# Patient Record
Sex: FEMALE | Race: ASIAN | NOT HISPANIC OR LATINO
[De-identification: names, ages, dates, MRNs, and addresses within clinical notes are randomized per-mention and may not be internally consistent; named-entity substitution may affect disease eponyms.]

---

## 2017-12-28 ENCOUNTER — RESULT REVIEW (OUTPATIENT)
Age: 32
End: 2017-12-28

## 2020-07-26 ENCOUNTER — RESULT REVIEW (OUTPATIENT)
Age: 35
End: 2020-07-26

## 2020-07-31 PROBLEM — Z00.00 ENCOUNTER FOR PREVENTIVE HEALTH EXAMINATION: Status: ACTIVE | Noted: 2020-07-31

## 2020-08-04 ENCOUNTER — ASOB RESULT (OUTPATIENT)
Age: 35
End: 2020-08-04

## 2020-08-04 ENCOUNTER — APPOINTMENT (OUTPATIENT)
Dept: MATERNAL FETAL MEDICINE | Facility: CLINIC | Age: 35
End: 2020-08-04
Payer: COMMERCIAL

## 2020-08-04 PROCEDURE — 99202 OFFICE O/P NEW SF 15 MIN: CPT | Mod: 95

## 2020-08-14 ENCOUNTER — TRANSCRIPTION ENCOUNTER (OUTPATIENT)
Age: 35
End: 2020-08-14

## 2020-08-19 ENCOUNTER — ASOB RESULT (OUTPATIENT)
Age: 35
End: 2020-08-19

## 2020-08-19 ENCOUNTER — APPOINTMENT (OUTPATIENT)
Dept: ANTEPARTUM | Facility: CLINIC | Age: 35
End: 2020-08-19
Payer: COMMERCIAL

## 2020-08-19 PROCEDURE — 76801 OB US < 14 WKS SINGLE FETUS: CPT

## 2020-08-19 PROCEDURE — 36416 COLLJ CAPILLARY BLOOD SPEC: CPT

## 2020-08-19 PROCEDURE — 76813 OB US NUCHAL MEAS 1 GEST: CPT

## 2020-08-20 ENCOUNTER — TRANSCRIPTION ENCOUNTER (OUTPATIENT)
Age: 35
End: 2020-08-20

## 2020-08-20 ENCOUNTER — RESULT REVIEW (OUTPATIENT)
Age: 35
End: 2020-08-20

## 2020-08-21 ENCOUNTER — RESULT REVIEW (OUTPATIENT)
Age: 35
End: 2020-08-21

## 2020-08-21 ENCOUNTER — TRANSCRIPTION ENCOUNTER (OUTPATIENT)
Age: 35
End: 2020-08-21

## 2020-08-21 ENCOUNTER — EMERGENCY (EMERGENCY)
Facility: HOSPITAL | Age: 35
LOS: 1 days | Discharge: HOME | End: 2020-08-21
Attending: STUDENT IN AN ORGANIZED HEALTH CARE EDUCATION/TRAINING PROGRAM
Payer: COMMERCIAL

## 2020-08-21 ENCOUNTER — INPATIENT (INPATIENT)
Facility: HOSPITAL | Age: 35
LOS: 0 days | Discharge: HOME | DRG: 770 | End: 2020-08-21
Attending: OBSTETRICS & GYNECOLOGY | Admitting: OBSTETRICS & GYNECOLOGY
Payer: COMMERCIAL

## 2020-08-21 VITALS
SYSTOLIC BLOOD PRESSURE: 112 MMHG | TEMPERATURE: 99 F | DIASTOLIC BLOOD PRESSURE: 69 MMHG | RESPIRATION RATE: 16 BRPM | HEART RATE: 78 BPM | OXYGEN SATURATION: 100 %

## 2020-08-21 VITALS
HEIGHT: 63 IN | WEIGHT: 160.94 LBS | TEMPERATURE: 98 F | OXYGEN SATURATION: 98 % | HEART RATE: 96 BPM | RESPIRATION RATE: 18 BRPM | SYSTOLIC BLOOD PRESSURE: 125 MMHG | DIASTOLIC BLOOD PRESSURE: 90 MMHG

## 2020-08-21 VITALS
WEIGHT: 160.06 LBS | DIASTOLIC BLOOD PRESSURE: 92 MMHG | SYSTOLIC BLOOD PRESSURE: 137 MMHG | RESPIRATION RATE: 18 BRPM | OXYGEN SATURATION: 98 % | TEMPERATURE: 98 F | HEART RATE: 104 BPM | HEIGHT: 63 IN

## 2020-08-21 DIAGNOSIS — O03.4 INCOMPLETE SPONTANEOUS ABORTION WITHOUT COMPLICATION: ICD-10-CM

## 2020-08-21 DIAGNOSIS — O03.9 COMPLETE OR UNSPECIFIED SPONTANEOUS ABORTION WITHOUT COMPLICATION: ICD-10-CM

## 2020-08-21 LAB
ALBUMIN SERPL ELPH-MCNC: 2.7 G/DL — LOW (ref 3.5–5)
ALBUMIN SERPL ELPH-MCNC: 2.8 G/DL — LOW (ref 3.5–5)
ALP SERPL-CCNC: 72 U/L — SIGNIFICANT CHANGE UP (ref 40–120)
ALP SERPL-CCNC: 73 U/L — SIGNIFICANT CHANGE UP (ref 40–120)
ALT FLD-CCNC: 19 U/L DA — SIGNIFICANT CHANGE UP (ref 10–60)
ALT FLD-CCNC: 19 U/L DA — SIGNIFICANT CHANGE UP (ref 10–60)
ANION GAP SERPL CALC-SCNC: 7 MMOL/L — SIGNIFICANT CHANGE UP (ref 5–17)
ANION GAP SERPL CALC-SCNC: 7 MMOL/L — SIGNIFICANT CHANGE UP (ref 5–17)
APTT BLD: 28.9 SEC — SIGNIFICANT CHANGE UP (ref 27.5–35.5)
AST SERPL-CCNC: 11 U/L — SIGNIFICANT CHANGE UP (ref 10–40)
AST SERPL-CCNC: 9 U/L — LOW (ref 10–40)
BASOPHILS # BLD AUTO: 0.05 K/UL — SIGNIFICANT CHANGE UP (ref 0–0.2)
BASOPHILS # BLD AUTO: 0.05 K/UL — SIGNIFICANT CHANGE UP (ref 0–0.2)
BASOPHILS NFR BLD AUTO: 0.2 % — SIGNIFICANT CHANGE UP (ref 0–2)
BASOPHILS NFR BLD AUTO: 0.3 % — SIGNIFICANT CHANGE UP (ref 0–2)
BILIRUB SERPL-MCNC: 0.4 MG/DL — SIGNIFICANT CHANGE UP (ref 0.2–1.2)
BILIRUB SERPL-MCNC: 0.5 MG/DL — SIGNIFICANT CHANGE UP (ref 0.2–1.2)
BUN SERPL-MCNC: 12 MG/DL — SIGNIFICANT CHANGE UP (ref 7–18)
BUN SERPL-MCNC: 12 MG/DL — SIGNIFICANT CHANGE UP (ref 7–18)
CALCIUM SERPL-MCNC: 8.6 MG/DL — SIGNIFICANT CHANGE UP (ref 8.4–10.5)
CALCIUM SERPL-MCNC: 8.7 MG/DL — SIGNIFICANT CHANGE UP (ref 8.4–10.5)
CHLORIDE SERPL-SCNC: 104 MMOL/L — SIGNIFICANT CHANGE UP (ref 96–108)
CHLORIDE SERPL-SCNC: 106 MMOL/L — SIGNIFICANT CHANGE UP (ref 96–108)
CO2 SERPL-SCNC: 24 MMOL/L — SIGNIFICANT CHANGE UP (ref 22–31)
CO2 SERPL-SCNC: 25 MMOL/L — SIGNIFICANT CHANGE UP (ref 22–31)
CREAT SERPL-MCNC: 0.57 MG/DL — SIGNIFICANT CHANGE UP (ref 0.5–1.3)
CREAT SERPL-MCNC: 0.58 MG/DL — SIGNIFICANT CHANGE UP (ref 0.5–1.3)
EOSINOPHIL # BLD AUTO: 0.02 K/UL — SIGNIFICANT CHANGE UP (ref 0–0.5)
EOSINOPHIL # BLD AUTO: 0.11 K/UL — SIGNIFICANT CHANGE UP (ref 0–0.5)
EOSINOPHIL NFR BLD AUTO: 0.1 % — SIGNIFICANT CHANGE UP (ref 0–6)
EOSINOPHIL NFR BLD AUTO: 0.6 % — SIGNIFICANT CHANGE UP (ref 0–6)
GLUCOSE SERPL-MCNC: 84 MG/DL — SIGNIFICANT CHANGE UP (ref 70–99)
GLUCOSE SERPL-MCNC: 97 MG/DL — SIGNIFICANT CHANGE UP (ref 70–99)
HCG SERPL-ACNC: HIGH MIU/ML
HCG SERPL-ACNC: SIGNIFICANT CHANGE UP MIU/ML
HCT VFR BLD CALC: 35.5 % — SIGNIFICANT CHANGE UP (ref 34.5–45)
HCT VFR BLD CALC: 37.2 % — SIGNIFICANT CHANGE UP (ref 34.5–45)
HGB BLD-MCNC: 11.7 G/DL — SIGNIFICANT CHANGE UP (ref 11.5–15.5)
HGB BLD-MCNC: 12.2 G/DL — SIGNIFICANT CHANGE UP (ref 11.5–15.5)
HIV 1 & 2 AB SERPL IA.RAPID: SIGNIFICANT CHANGE UP
IMM GRANULOCYTES NFR BLD AUTO: 0.4 % — SIGNIFICANT CHANGE UP (ref 0–1.5)
IMM GRANULOCYTES NFR BLD AUTO: 0.5 % — SIGNIFICANT CHANGE UP (ref 0–1.5)
INR BLD: 1.06 RATIO — SIGNIFICANT CHANGE UP (ref 0.88–1.16)
LYMPHOCYTES # BLD AUTO: 15.4 % — SIGNIFICANT CHANGE UP (ref 13–44)
LYMPHOCYTES # BLD AUTO: 17.1 % — SIGNIFICANT CHANGE UP (ref 13–44)
LYMPHOCYTES # BLD AUTO: 3.12 K/UL — SIGNIFICANT CHANGE UP (ref 1–3.3)
LYMPHOCYTES # BLD AUTO: 3.23 K/UL — SIGNIFICANT CHANGE UP (ref 1–3.3)
MCHC RBC-ENTMCNC: 26.6 PG — LOW (ref 27–34)
MCHC RBC-ENTMCNC: 26.8 PG — LOW (ref 27–34)
MCHC RBC-ENTMCNC: 32.8 GM/DL — SIGNIFICANT CHANGE UP (ref 32–36)
MCHC RBC-ENTMCNC: 33 GM/DL — SIGNIFICANT CHANGE UP (ref 32–36)
MCV RBC AUTO: 81 FL — SIGNIFICANT CHANGE UP (ref 80–100)
MCV RBC AUTO: 81.2 FL — SIGNIFICANT CHANGE UP (ref 80–100)
MONOCYTES # BLD AUTO: 0.91 K/UL — HIGH (ref 0–0.9)
MONOCYTES # BLD AUTO: 0.94 K/UL — HIGH (ref 0–0.9)
MONOCYTES NFR BLD AUTO: 4.3 % — SIGNIFICANT CHANGE UP (ref 2–14)
MONOCYTES NFR BLD AUTO: 5.2 % — SIGNIFICANT CHANGE UP (ref 2–14)
NEUTROPHILS # BLD AUTO: 13.94 K/UL — HIGH (ref 1.8–7.4)
NEUTROPHILS # BLD AUTO: 16.66 K/UL — HIGH (ref 1.8–7.4)
NEUTROPHILS NFR BLD AUTO: 76.3 % — SIGNIFICANT CHANGE UP (ref 43–77)
NEUTROPHILS NFR BLD AUTO: 79.6 % — HIGH (ref 43–77)
NRBC # BLD: 0 /100 WBCS — SIGNIFICANT CHANGE UP (ref 0–0)
NRBC # BLD: 0 /100 WBCS — SIGNIFICANT CHANGE UP (ref 0–0)
PLATELET # BLD AUTO: 339 K/UL — SIGNIFICANT CHANGE UP (ref 150–400)
PLATELET # BLD AUTO: 377 K/UL — SIGNIFICANT CHANGE UP (ref 150–400)
POTASSIUM SERPL-MCNC: 4 MMOL/L — SIGNIFICANT CHANGE UP (ref 3.5–5.3)
POTASSIUM SERPL-MCNC: 4 MMOL/L — SIGNIFICANT CHANGE UP (ref 3.5–5.3)
POTASSIUM SERPL-SCNC: 4 MMOL/L — SIGNIFICANT CHANGE UP (ref 3.5–5.3)
POTASSIUM SERPL-SCNC: 4 MMOL/L — SIGNIFICANT CHANGE UP (ref 3.5–5.3)
PROT SERPL-MCNC: 7.2 G/DL — SIGNIFICANT CHANGE UP (ref 6–8.3)
PROT SERPL-MCNC: 7.3 G/DL — SIGNIFICANT CHANGE UP (ref 6–8.3)
PROTHROM AB SERPL-ACNC: 12.4 SEC — SIGNIFICANT CHANGE UP (ref 10.6–13.6)
RBC # BLD: 4.37 M/UL — SIGNIFICANT CHANGE UP (ref 3.8–5.2)
RBC # BLD: 4.59 M/UL — SIGNIFICANT CHANGE UP (ref 3.8–5.2)
RBC # FLD: 12.4 % — SIGNIFICANT CHANGE UP (ref 10.3–14.5)
RBC # FLD: 12.4 % — SIGNIFICANT CHANGE UP (ref 10.3–14.5)
SARS-COV-2 RNA SPEC QL NAA+PROBE: SIGNIFICANT CHANGE UP
SODIUM SERPL-SCNC: 136 MMOL/L — SIGNIFICANT CHANGE UP (ref 135–145)
SODIUM SERPL-SCNC: 137 MMOL/L — SIGNIFICANT CHANGE UP (ref 135–145)
WBC # BLD: 18.25 K/UL — HIGH (ref 3.8–10.5)
WBC # BLD: 20.96 K/UL — HIGH (ref 3.8–10.5)
WBC # FLD AUTO: 18.25 K/UL — HIGH (ref 3.8–10.5)
WBC # FLD AUTO: 20.96 K/UL — HIGH (ref 3.8–10.5)

## 2020-08-21 PROCEDURE — 85730 THROMBOPLASTIN TIME PARTIAL: CPT

## 2020-08-21 PROCEDURE — 84702 CHORIONIC GONADOTROPIN TEST: CPT

## 2020-08-21 PROCEDURE — 99285 EMERGENCY DEPT VISIT HI MDM: CPT | Mod: 25

## 2020-08-21 PROCEDURE — 88309 TISSUE EXAM BY PATHOLOGIST: CPT

## 2020-08-21 PROCEDURE — 86703 HIV-1/HIV-2 1 RESULT ANTBDY: CPT

## 2020-08-21 PROCEDURE — 86901 BLOOD TYPING SEROLOGIC RH(D): CPT

## 2020-08-21 PROCEDURE — 96374 THER/PROPH/DIAG INJ IV PUSH: CPT

## 2020-08-21 PROCEDURE — 85027 COMPLETE CBC AUTOMATED: CPT

## 2020-08-21 PROCEDURE — 80053 COMPREHEN METABOLIC PANEL: CPT

## 2020-08-21 PROCEDURE — 88305 TISSUE EXAM BY PATHOLOGIST: CPT

## 2020-08-21 PROCEDURE — 99284 EMERGENCY DEPT VISIT MOD MDM: CPT | Mod: 25

## 2020-08-21 PROCEDURE — 88305 TISSUE EXAM BY PATHOLOGIST: CPT | Mod: 26

## 2020-08-21 PROCEDURE — 88309 TISSUE EXAM BY PATHOLOGIST: CPT | Mod: 26

## 2020-08-21 PROCEDURE — 36415 COLL VENOUS BLD VENIPUNCTURE: CPT

## 2020-08-21 PROCEDURE — 76830 TRANSVAGINAL US NON-OB: CPT

## 2020-08-21 PROCEDURE — 86850 RBC ANTIBODY SCREEN: CPT

## 2020-08-21 PROCEDURE — 85610 PROTHROMBIN TIME: CPT

## 2020-08-21 PROCEDURE — 87635 SARS-COV-2 COVID-19 AMP PRB: CPT

## 2020-08-21 PROCEDURE — 76856 US EXAM PELVIC COMPLETE: CPT | Mod: 26

## 2020-08-21 PROCEDURE — 76830 TRANSVAGINAL US NON-OB: CPT | Mod: 26

## 2020-08-21 PROCEDURE — 76856 US EXAM PELVIC COMPLETE: CPT

## 2020-08-21 PROCEDURE — 86900 BLOOD TYPING SEROLOGIC ABO: CPT

## 2020-08-21 RX ORDER — MORPHINE SULFATE 50 MG/1
4 CAPSULE, EXTENDED RELEASE ORAL EVERY 4 HOURS
Refills: 0 | Status: DISCONTINUED | OUTPATIENT
Start: 2020-08-21 | End: 2020-08-21

## 2020-08-21 RX ORDER — SODIUM CHLORIDE 9 MG/ML
1000 INJECTION, SOLUTION INTRAVENOUS
Refills: 0 | Status: DISCONTINUED | OUTPATIENT
Start: 2020-08-21 | End: 2020-08-21

## 2020-08-21 RX ORDER — MISOPROSTOL 200 UG/1
4 TABLET ORAL
Qty: 4 | Refills: 0
Start: 2020-08-21

## 2020-08-21 RX ORDER — ACETAMINOPHEN 500 MG
975 TABLET ORAL EVERY 6 HOURS
Refills: 0 | Status: DISCONTINUED | OUTPATIENT
Start: 2020-08-21 | End: 2020-08-21

## 2020-08-21 RX ORDER — FENTANYL CITRATE 50 UG/ML
25 INJECTION INTRAVENOUS
Refills: 0 | Status: DISCONTINUED | OUTPATIENT
Start: 2020-08-21 | End: 2020-08-21

## 2020-08-21 RX ORDER — FENTANYL CITRATE 50 UG/ML
50 INJECTION INTRAVENOUS
Refills: 0 | Status: DISCONTINUED | OUTPATIENT
Start: 2020-08-21 | End: 2020-08-21

## 2020-08-21 RX ORDER — MORPHINE SULFATE 50 MG/1
4 CAPSULE, EXTENDED RELEASE ORAL ONCE
Refills: 0 | Status: DISCONTINUED | OUTPATIENT
Start: 2020-08-21 | End: 2020-08-21

## 2020-08-21 RX ORDER — ONDANSETRON 8 MG/1
4 TABLET, FILM COATED ORAL EVERY 6 HOURS
Refills: 0 | Status: DISCONTINUED | OUTPATIENT
Start: 2020-08-21 | End: 2020-08-21

## 2020-08-21 RX ORDER — KETOROLAC TROMETHAMINE 30 MG/ML
15 SYRINGE (ML) INJECTION EVERY 6 HOURS
Refills: 0 | Status: DISCONTINUED | OUTPATIENT
Start: 2020-08-21 | End: 2020-08-21

## 2020-08-21 RX ADMIN — MORPHINE SULFATE 4 MILLIGRAM(S): 50 CAPSULE, EXTENDED RELEASE ORAL at 04:21

## 2020-08-21 RX ADMIN — Medication 975 MILLIGRAM(S): at 16:28

## 2020-08-21 RX ADMIN — SODIUM CHLORIDE 200 MILLILITER(S): 9 INJECTION, SOLUTION INTRAVENOUS at 12:15

## 2020-08-21 RX ADMIN — Medication 975 MILLIGRAM(S): at 15:52

## 2020-08-21 NOTE — CONSULT NOTE ADULT - SUBJECTIVE AND OBJECTIVE BOX
HPI: 35y  LMP  presents with vaginal bleeding saturated one full pad from 7am-9am with clots ,no vaginal discharge; minimal pelvic pain, no chest pain , no sob, no  dizziness, no palpitations, no n/v/d/c, no fever; no chills.   Pt s/p incomplete ab  took last dose of Po cytotec 200mcg- 4 tabs PO x 1 at 530am. Last meal 7pm chicken, rice, plantain wrap with ice cream.     pob/gynhx:  ; no std's; no abn pap smear; no fibroids; last visit with [facility] in [place] 2020  pmhx: denies   pshx: c/s 2014: LINDA rubalcava, no complications   all: NKA  meds:  cytotec 200mcg--> 4 tabs every 8 hours took first dose at 530am   sochx: no etoh/drug/tobacco use    REVIEW OF SYSTEMS: see HPI	    PE:  Vital Signs Last 24 Hrs  T(C): 36.7 (21 Aug 2020 07:39), Max: 36.9 (21 Aug 2020 02:53)  T(F): 98 (21 Aug 2020 07:39), Max: 98.4 (21 Aug 2020 02:53)  HR: 96 (21 Aug 2020 07:39) (96 - 104)  BP: 125/90 (21 Aug 2020 07:39) (125/90 - 137/92)  BP(mean): --  RR: 18 (21 Aug 2020 07:39) (18 - 18)  SpO2: 98% (21 Aug 2020 07:39) (98% - 98%)    abd: +bs; soft, nt, nd, no rebound or guarding; no cvat b/l  pelvis: no cmt; moderate vaginal bleeding ; no odor, 1cm/long, no uterine tenderness; uterus approx 10 wks size regular in contour, mobile. No adnexal tenderness or masses appreciated b/l     LABS:                        12.2   18.25 )-----------( 377      ( 21 Aug 2020 03:56 )             37.2     08-21    137  |  106  |  12  ----------------------------<  97  4.0   |  24  |  0.57    Ca    8.6      21 Aug 2020 03:56    TPro  7.3  /  Alb  2.8<L>  /  TBili  0.4  /  DBili  x   /  AST  9<L>  /  ALT  19  /  AlkPhos  73  08-21    PT/INR - ( 21 Aug 2020 03:56 )   PT: 12.4 sec;   INR: 1.06 ratio         PTT - ( 21 Aug 2020 03:56 )  PTT:28.9 sec      RADIOLOGY & ADDITIONAL STUDIES:  sono repeat pending     a/p  s/p incomplete  at 12 weeks s/p PO cytotec with Vaginal bleeding   will repeat cbc, bhcg, TV sono   will reevaluate after labs and sono is back, will keep NPO   -d/w Dr Villela

## 2020-08-21 NOTE — CONSULT NOTE ADULT - PROBLEM SELECTOR RECOMMENDATION 9
-discussed the risks and benefits of dilation and curettage versus misoprostol therapy with the patient at bedside. Pt requests a trial of misoprostol therapy at this time. Pt understands and is in agreement with plan. Will give 800mcg Misoprostol buccally and re-evaluate in 1 hour.   -specimen sent to pathology  -pt seen with and plan per katie Jarquin attending
dilation and curettage: suction   nothing in vagina x 6 weeks  no heavy lifting

## 2020-08-21 NOTE — ED PROVIDER NOTE - PROGRESS NOTE DETAILS
Dr Bennett in the ER, recommend admission and D&C in the OR. Pt agrees w plan, will send STAT Covid swab

## 2020-08-21 NOTE — DISCHARGE NOTE NURSING/CASE MANAGEMENT/SOCIAL WORK - PATIENT PORTAL LINK FT
You can access the FollowMyHealth Patient Portal offered by [facility] by registering at the following website: [email or web address]. By joining [employer]’s FollowMyHealth portal, you will also be able to view your health information using other applications (apps) compatible with our system.

## 2020-08-21 NOTE — ED ADULT TRIAGE NOTE - CHIEF COMPLAINT QUOTE
as per pt " I got discharge I hour ago I started getting  heavy vaginal bleeding , I was told to come back "

## 2020-08-21 NOTE — ED PROVIDER NOTE - OBJECTIVE STATEMENT
35 year old female, 12 weeks pregnant, , presenting with vaginal bleeding that was heavier just prior to arriving in the ED today. Denies nausea, vomiting, abdominal pain, or any other complaints. Endorses going through 3 pads before coming in.

## 2020-08-21 NOTE — ED ADULT NURSE NOTE - OBJECTIVE STATEMENT
Pt  was just discharged from this ER about 1 hr ago  As soon as she arrived home , had an episode of heavy vaginal bleeding  Had a spontaneous  this morning  C/o intermittent cramps

## 2020-08-21 NOTE — ED PROVIDER NOTE - GENITOURINARY, MLM
Large amounts of blood on external vaginal exam. Noting intact fetal parts have been falling outside of the cervical canal.

## 2020-08-21 NOTE — ED ADULT NURSE REASSESSMENT NOTE - NS ED NURSE REASSESS COMMENT FT1
4 am GYN  PA examined the patient   vaginal content  sent to pathology  morphine given for pain with good releif

## 2020-08-21 NOTE — ED PROVIDER NOTE - PATIENT PORTAL LINK FT
You can access the FollowMyHealth Patient Portal offered by [facility] by registering at the following website: [email or web address]. By joining [employer]’s FollowMyHealth portal, you will also be able to view your health information using other applications (apps) compatible with our system.

## 2020-08-21 NOTE — CONSULT NOTE ADULT - ASSESSMENT
a/p: 34 yo  siup at 2020 with complete vs incomplete SAB
CBC Full  -  ( 21 Aug 2020 10:22 )  WBC Count : 20.96 K/uL  RBC Count : 4.37 M/uL  Hemoglobin : 11.7 g/dL  Hematocrit : 35.5 %  Platelet Count - Automated : 339 K/uL  Mean Cell Volume : 81.2 fl  Mean Cell Hemoglobin : 26.8 pg  Mean Cell Hemoglobin Concentration : 33.0 gm/dL  Auto Neutrophil # : 16.66 K/uL  Auto Lymphocyte # : 3.23 K/uL  Auto Monocyte # : 0.91 K/uL  Auto Eosinophil # : 0.02 K/uL  Auto Basophil # : 0.05 K/uL  Auto Neutrophil % : 79.6 %  Auto Lymphocyte % : 15.4 %  < from: US Transvaginal (20 @ 10:00) >  INTERPRETATION:  Transabdominal and transvaginal pelvic ultrasounds    Indication: Spontaneous . Rule out retained products of conception.    Transabdominal and transvaginal pelvic ultrasounds are performed and they are reported together. No prior examination is available for comparison. The uterus measures 12.6 x 7.6 x 8.2 cm. There is a 2.9 cm intramural fibroid in the anterior uterine segment. The endometrial stripe measures 2.5 cm in thickness. Heterogeneous mixed hyper and hypoechoic material is noted within the endometrium without internal color Doppler flow, suggestive of hemorrhage. No evidence for retained products of conception. The cervix is closed.    The right ovary measures 3.7 x 3.4 x 3.6 cm and the left ovary measures 3.9 x 2.6 x 3.9 cm. Both ovaries appear grossly unremarkable. No pelvic free fluid.    Impression:    Heterogeneous mixed hyper and hypoechoic material within the endometrium without internal color Doppler flow, suggestive of hemorrhage. No evidence for retained products of conception    a/p   incomplete  with vaginal bleeding s/p cytotec   obtained consent for D&C suction  npo  OR team notified   Pt seen with

## 2020-08-21 NOTE — CONSULT NOTE ADULT - SUBJECTIVE AND OBJECTIVE BOX
HPI: 36 yo  LMP 2020 siup at 11wks 5 days presents to the ED c/o heavy vaginal bleeding since 2am this morning. Pt states that she began spotting yesterday around noon and around 2 am she was awoken with abd cramping and heavier bleeding. states that she has since continued to have heavy vaginal bleeding with clots and en route to the hospital she passed what she believes to be the fetus. Pt denies fever, chills, pelvic pain, cp, sob, palpitations, n/v/d/c, lightheadedness, dizziness, or syncope. Offers no other complaints at this time.     PMHx: denies   medS: PNV  all: nka  PSH: c/s x1   social: denies T/A/D, denies hx of anxiety or depression   POBGYNHx: denies fibroids, ovarian cysts, abnl paps, or STIs  P1:   c/s 2014 FT breech baby girl 8lbs 12 oz, c/b gHTN       REVIEW OF SYSTEMS: see HPI	    PE:  Vital Signs Last 24 Hrs  T(C): 36.9 (21 Aug 2020 02:53), Max: 36.9 (21 Aug 2020 02:53)  T(F): 98.4 (21 Aug 2020 02:53), Max: 98.4 (21 Aug 2020 02:53)  HR: 104 (21 Aug 2020 02:53) (104 - 104)  BP: 137/92 (21 Aug 2020 02:53) (137/92 - 137/92)  BP(mean): --  RR: 18 (21 Aug 2020 02:53) (18 - 18)  SpO2: 98% (21 Aug 2020 02:53) (98% - 98%)      VSS  gen: in NAD, resting comfortably   abd: soft, nt, nd, no rebound or guarding  ext: no edema, no calf tenderness  pelvis: speculum inserted, clots visualized in vault and removed with ring forceps, placenta visualized and removed with ring forceps to be sent to pathology.  limited bedside sono: endometrial stripe measures 2.09 cm        LABS:                        12.2   18.25 )-----------( 377      ( 21 Aug 2020 03:56 )             37.2     08-21    137  |  106  |  12  ----------------------------<  97  4.0   |  24  |  0.57    Ca    8.6      21 Aug 2020 03:56    TPro  7.3  /  Alb  2.8<L>  /  TBili  0.4  /  DBili  x   /  AST  9<L>  /  ALT  19  /  AlkPhos  73  08-21    PT/INR - ( 21 Aug 2020 03:56 )   PT: 12.4 sec;   INR: 1.06 ratio         PTT - ( 21 Aug 2020 03:56 )  PTT:28.9 sec    Type + Screen (20 @ 04:12)    ABO RH Interpretation: B POS HPI: 34 yo  LMP 2020 siup at 11wks 5 days presents to the ED c/o heavy vaginal bleeding since 2am this morning. Pt states that she began spotting yesterday around noon and around 2 am she was awoken with abd cramping and heavier bleeding. states that she has since continued to have heavy vaginal bleeding with clots and en route to the hospital she passed what she believes to be the fetus. Pt denies fever, chills, pelvic pain, cp, sob, palpitations, n/v/d/c, lightheadedness, dizziness, or syncope. Offers no other complaints at this time.     PMHx: denies   medS: PNV  all: nka  PSH: c/s x1   social: denies T/A/D, denies hx of anxiety or depression   POBGYNHx: denies fibroids, ovarian cysts, abnl paps, or STIs  P1:   c/s 2014 FT breech baby girl 8lbs 12 oz, c/b gHTN       REVIEW OF SYSTEMS: see HPI	    PE:  Vital Signs Last 24 Hrs  T(C): 36.9 (21 Aug 2020 02:53), Max: 36.9 (21 Aug 2020 02:53)  T(F): 98.4 (21 Aug 2020 02:53), Max: 98.4 (21 Aug 2020 02:53)  HR: 104 (21 Aug 2020 02:53) (104 - 104)  BP: 137/92 (21 Aug 2020 02:53) (137/92 - 137/92)  BP(mean): --  RR: 18 (21 Aug 2020 02:53) (18 - 18)  SpO2: 98% (21 Aug 2020 02:53) (98% - 98%)      VSS  gen: in NAD, resting comfortably   abd: soft, nt, nd, no rebound or guarding  ext: no edema, no calf tenderness  pelvis: speculum inserted, clots visualized in vault and removed with ring forceps, placenta visualized and removed with ring forceps to be sent to pathology. performed by Dr. Wellington  limited bedside sono: endometrial stripe measures 2.09 cm, performed by Dr. Wellington.         LABS:                        12.2   18.25 )-----------( 377      ( 21 Aug 2020 03:56 )             37.2     08-21    137  |  106  |  12  ----------------------------<  97  4.0   |  24  |  0.57    Ca    8.6      21 Aug 2020 03:56    TPro  7.3  /  Alb  2.8<L>  /  TBili  0.4  /  DBili  x   /  AST  9<L>  /  ALT  19  /  AlkPhos  73  08-21    PT/INR - ( 21 Aug 2020 03:56 )   PT: 12.4 sec;   INR: 1.06 ratio         PTT - ( 21 Aug 2020 03:56 )  PTT:28.9 sec    Type + Screen (20 @ 04:12)    ABO RH Interpretation: B POS

## 2020-08-21 NOTE — ED ADULT NURSE NOTE - CHPI ED NUR SYMPTOMS NEG
no abdominal pain/no back pain/no coffee grounds emesis/no discharge/no fever/no nausea/no pain/no vomiting

## 2020-08-21 NOTE — DISCHARGE NOTE PROVIDER - CARE PROVIDER_API CALL
Alexis Herring  Obstetrics and Gynecology  [address] [facility], [address] [place] [address]  Phone: [phone]  Fax: [phone]  Follow Up Time:

## 2020-08-21 NOTE — ED ADULT NURSE NOTE - CAS EDN DISCHARGE INTERVENTIONS
Patient Seen in: [facility] Emergency Department    History   Patient presents with:  Nausea/Vomiting/Diarrhea (gastrointestinal)    Stated Complaint: vomiting x30min with n/t to bilat hands and chest    HPI    Patient is a 80-year-old female presen acute distress, Well appearing and non-toxic, Alert and oriented X 3   HEENT: Normocephalic, atraumatic. Moist mucous membranes. Pupils equal round reactive to light accommodation, extraocular motion is intact, sclerae white, conjunctiva is pink.   Waldo Brooks RAINBOW DRAW LAVENDER   RAINBOW DRAW LIGHT GREEN   RAINBOW DRAW GOLD   CBC W/ DIFFERENTIAL   glucose 137    EKG    Rate, intervals and axes as noted on EKG Report. Rate: 72  Rhythm: Sinus Rhythm  Reading: Nonspecific changes.   No acute process paroxysmal positional vertigo, unspecified laterality  (primary encounter diagnosis)    Disposition:  Discharge  5/20/2019  2:43 pm    Follow-up:  Tucker Noe, [address] [facility] [address] [phone]      As needed    Rosa Wray MD IV discontinued, cath removed intact

## 2020-08-21 NOTE — ED PROVIDER NOTE - CLINICAL SUMMARY MEDICAL DECISION MAKING FREE TEXT BOX
34yo F w spontaneous, likely incomplete , w recurring bleeding after Misoprostol 1 hr ago. VS wnl, patient well appearing, pink conjunctivae. OB PA in the ED, will follow recs

## 2020-08-21 NOTE — ED PROVIDER NOTE - PROGRESS NOTE DETAILS
patient had bedside evacuation of product by gyn. patient endorses bleeding slowed down after cytotecc. per gyn pa, patient stable for dc. patient vital stable. givne cytotec for tonight. instructed to return if noting symptoms of anemia, worsening bleed, pain or other new symptoms.

## 2020-08-28 ENCOUNTER — TRANSCRIPTION ENCOUNTER (OUTPATIENT)
Age: 35
End: 2020-08-28

## 2020-10-14 ENCOUNTER — APPOINTMENT (OUTPATIENT)
Dept: ANTEPARTUM | Facility: CLINIC | Age: 35
End: 2020-10-14

## 2022-04-08 ENCOUNTER — OUTPATIENT (OUTPATIENT)
Dept: OUTPATIENT SERVICES | Facility: HOSPITAL | Age: 37
LOS: 1 days | End: 2022-04-08
Payer: COMMERCIAL

## 2022-04-08 DIAGNOSIS — O26.899 OTHER SPECIFIED PREGNANCY RELATED CONDITIONS, UNSPECIFIED TRIMESTER: ICD-10-CM

## 2022-04-08 DIAGNOSIS — Z3A.00 WEEKS OF GESTATION OF PREGNANCY NOT SPECIFIED: ICD-10-CM

## 2022-04-08 LAB
ALBUMIN SERPL ELPH-MCNC: 2.6 G/DL — LOW (ref 3.5–5)
ALP SERPL-CCNC: 46 U/L — SIGNIFICANT CHANGE UP (ref 40–120)
ALT FLD-CCNC: 19 U/L DA — SIGNIFICANT CHANGE UP (ref 10–60)
ANION GAP SERPL CALC-SCNC: 9 MMOL/L — SIGNIFICANT CHANGE UP (ref 5–17)
APPEARANCE UR: CLEAR — SIGNIFICANT CHANGE UP
APTT BLD: 27 SEC — LOW (ref 27.5–35.5)
AST SERPL-CCNC: 13 U/L — SIGNIFICANT CHANGE UP (ref 10–40)
BACTERIA # UR AUTO: ABNORMAL /HPF
BASOPHILS # BLD AUTO: 0.02 K/UL — SIGNIFICANT CHANGE UP (ref 0–0.2)
BASOPHILS NFR BLD AUTO: 0.2 % — SIGNIFICANT CHANGE UP (ref 0–2)
BILIRUB SERPL-MCNC: 0.3 MG/DL — SIGNIFICANT CHANGE UP (ref 0.2–1.2)
BILIRUB UR-MCNC: NEGATIVE — SIGNIFICANT CHANGE UP
BUN SERPL-MCNC: 8 MG/DL — SIGNIFICANT CHANGE UP (ref 7–18)
CALCIUM SERPL-MCNC: 9.2 MG/DL — SIGNIFICANT CHANGE UP (ref 8.4–10.5)
CHLORIDE SERPL-SCNC: 108 MMOL/L — SIGNIFICANT CHANGE UP (ref 96–108)
CO2 SERPL-SCNC: 22 MMOL/L — SIGNIFICANT CHANGE UP (ref 22–31)
COLOR SPEC: YELLOW — SIGNIFICANT CHANGE UP
CREAT ?TM UR-MCNC: 92 MG/DL — SIGNIFICANT CHANGE UP
CREAT SERPL-MCNC: 0.55 MG/DL — SIGNIFICANT CHANGE UP (ref 0.5–1.3)
DIFF PNL FLD: ABNORMAL
EGFR: 122 ML/MIN/1.73M2 — SIGNIFICANT CHANGE UP
EOSINOPHIL # BLD AUTO: 0.07 K/UL — SIGNIFICANT CHANGE UP (ref 0–0.5)
EOSINOPHIL NFR BLD AUTO: 0.7 % — SIGNIFICANT CHANGE UP (ref 0–6)
EPI CELLS # UR: SIGNIFICANT CHANGE UP /HPF
FIBRINOGEN PPP-MCNC: 584 MG/DL — HIGH (ref 340–550)
GLUCOSE SERPL-MCNC: 95 MG/DL — SIGNIFICANT CHANGE UP (ref 70–99)
GLUCOSE UR QL: NEGATIVE — SIGNIFICANT CHANGE UP
HCT VFR BLD CALC: 32.8 % — LOW (ref 34.5–45)
HGB BLD-MCNC: 10.6 G/DL — LOW (ref 11.5–15.5)
IMM GRANULOCYTES NFR BLD AUTO: 0.5 % — SIGNIFICANT CHANGE UP (ref 0–1.5)
INR BLD: 0.98 RATIO — SIGNIFICANT CHANGE UP (ref 0.88–1.16)
KETONES UR-MCNC: NEGATIVE — SIGNIFICANT CHANGE UP
LDH SERPL L TO P-CCNC: 124 U/L — SIGNIFICANT CHANGE UP (ref 120–225)
LEUKOCYTE ESTERASE UR-ACNC: NEGATIVE — SIGNIFICANT CHANGE UP
LYMPHOCYTES # BLD AUTO: 2.41 K/UL — SIGNIFICANT CHANGE UP (ref 1–3.3)
LYMPHOCYTES # BLD AUTO: 23.5 % — SIGNIFICANT CHANGE UP (ref 13–44)
MCHC RBC-ENTMCNC: 27 PG — SIGNIFICANT CHANGE UP (ref 27–34)
MCHC RBC-ENTMCNC: 32.3 GM/DL — SIGNIFICANT CHANGE UP (ref 32–36)
MCV RBC AUTO: 83.5 FL — SIGNIFICANT CHANGE UP (ref 80–100)
MONOCYTES # BLD AUTO: 0.54 K/UL — SIGNIFICANT CHANGE UP (ref 0–0.9)
MONOCYTES NFR BLD AUTO: 5.3 % — SIGNIFICANT CHANGE UP (ref 2–14)
NEUTROPHILS # BLD AUTO: 7.15 K/UL — SIGNIFICANT CHANGE UP (ref 1.8–7.4)
NEUTROPHILS NFR BLD AUTO: 69.8 % — SIGNIFICANT CHANGE UP (ref 43–77)
NITRITE UR-MCNC: NEGATIVE — SIGNIFICANT CHANGE UP
NRBC # BLD: 0 /100 WBCS — SIGNIFICANT CHANGE UP (ref 0–0)
PH UR: 6 — SIGNIFICANT CHANGE UP (ref 5–8)
PLATELET # BLD AUTO: 347 K/UL — SIGNIFICANT CHANGE UP (ref 150–400)
POTASSIUM SERPL-MCNC: 3.9 MMOL/L — SIGNIFICANT CHANGE UP (ref 3.5–5.3)
POTASSIUM SERPL-SCNC: 3.9 MMOL/L — SIGNIFICANT CHANGE UP (ref 3.5–5.3)
PROT ?TM UR-MCNC: 10 MG/DL — SIGNIFICANT CHANGE UP (ref 0–12)
PROT SERPL-MCNC: 6.7 G/DL — SIGNIFICANT CHANGE UP (ref 6–8.3)
PROT UR-MCNC: NEGATIVE — SIGNIFICANT CHANGE UP
PROTHROM AB SERPL-ACNC: 11.7 SEC — SIGNIFICANT CHANGE UP (ref 10.5–13.4)
RBC # BLD: 3.93 M/UL — SIGNIFICANT CHANGE UP (ref 3.8–5.2)
RBC # FLD: 12.7 % — SIGNIFICANT CHANGE UP (ref 10.3–14.5)
RBC CASTS # UR COMP ASSIST: SIGNIFICANT CHANGE UP /HPF (ref 0–2)
SODIUM SERPL-SCNC: 139 MMOL/L — SIGNIFICANT CHANGE UP (ref 135–145)
SP GR SPEC: 1.01 — SIGNIFICANT CHANGE UP (ref 1.01–1.02)
URATE SERPL-MCNC: 3.6 MG/DL — SIGNIFICANT CHANGE UP (ref 2.5–7)
UROBILINOGEN FLD QL: NEGATIVE — SIGNIFICANT CHANGE UP
WBC # BLD: 10.24 K/UL — SIGNIFICANT CHANGE UP (ref 3.8–10.5)
WBC # FLD AUTO: 10.24 K/UL — SIGNIFICANT CHANGE UP (ref 3.8–10.5)
WBC UR QL: SIGNIFICANT CHANGE UP /HPF (ref 0–5)

## 2022-04-08 PROCEDURE — 85610 PROTHROMBIN TIME: CPT

## 2022-04-08 PROCEDURE — 76817 TRANSVAGINAL US OBSTETRIC: CPT

## 2022-04-08 PROCEDURE — 76815 OB US LIMITED FETUS(S): CPT | Mod: 26

## 2022-04-08 PROCEDURE — 59025 FETAL NON-STRESS TEST: CPT

## 2022-04-08 PROCEDURE — 82570 ASSAY OF URINE CREATININE: CPT

## 2022-04-08 PROCEDURE — 85730 THROMBOPLASTIN TIME PARTIAL: CPT

## 2022-04-08 PROCEDURE — 59025 FETAL NON-STRESS TEST: CPT | Mod: 26

## 2022-04-08 PROCEDURE — 81001 URINALYSIS AUTO W/SCOPE: CPT

## 2022-04-08 PROCEDURE — 85025 COMPLETE CBC W/AUTO DIFF WBC: CPT

## 2022-04-08 PROCEDURE — 84550 ASSAY OF BLOOD/URIC ACID: CPT

## 2022-04-08 PROCEDURE — 83615 LACTATE (LD) (LDH) ENZYME: CPT

## 2022-04-08 PROCEDURE — 76815 OB US LIMITED FETUS(S): CPT

## 2022-04-08 PROCEDURE — 80053 COMPREHEN METABOLIC PANEL: CPT

## 2022-04-08 PROCEDURE — 36415 COLL VENOUS BLD VENIPUNCTURE: CPT

## 2022-04-08 PROCEDURE — G0463: CPT

## 2022-04-08 PROCEDURE — 76817 TRANSVAGINAL US OBSTETRIC: CPT | Mod: 26

## 2022-04-08 PROCEDURE — 85384 FIBRINOGEN ACTIVITY: CPT

## 2022-04-08 PROCEDURE — 84156 ASSAY OF PROTEIN URINE: CPT

## 2022-07-05 PROBLEM — Z78.9 OTHER SPECIFIED HEALTH STATUS: Chronic | Status: ACTIVE | Noted: 2020-08-21

## 2022-07-14 ENCOUNTER — OUTPATIENT (OUTPATIENT)
Dept: OUTPATIENT SERVICES | Facility: HOSPITAL | Age: 37
LOS: 1 days | End: 2022-07-14

## 2022-07-14 VITALS
OXYGEN SATURATION: 98 % | RESPIRATION RATE: 16 BRPM | HEART RATE: 76 BPM | DIASTOLIC BLOOD PRESSURE: 76 MMHG | SYSTOLIC BLOOD PRESSURE: 110 MMHG | WEIGHT: 177.91 LBS | HEIGHT: 62 IN | TEMPERATURE: 98 F

## 2022-07-14 DIAGNOSIS — O13.9 GESTATIONAL [PREGNANCY-INDUCED] HYPERTENSION WITHOUT SIGNIFICANT PROTEINURIA, UNSPECIFIED TRIMESTER: ICD-10-CM

## 2022-07-14 DIAGNOSIS — O09.523 SUPERVISION OF ELDERLY MULTIGRAVIDA, THIRD TRIMESTER: ICD-10-CM

## 2022-07-14 LAB
ANION GAP SERPL CALC-SCNC: 13 MMOL/L — SIGNIFICANT CHANGE UP (ref 7–14)
APPEARANCE UR: ABNORMAL
BILIRUB UR-MCNC: NEGATIVE — SIGNIFICANT CHANGE UP
BLD GP AB SCN SERPL QL: NEGATIVE — SIGNIFICANT CHANGE UP
BUN SERPL-MCNC: 8 MG/DL — SIGNIFICANT CHANGE UP (ref 7–23)
CALCIUM SERPL-MCNC: 9.2 MG/DL — SIGNIFICANT CHANGE UP (ref 8.4–10.5)
CHLORIDE SERPL-SCNC: 100 MMOL/L — SIGNIFICANT CHANGE UP (ref 98–107)
CO2 SERPL-SCNC: 21 MMOL/L — LOW (ref 22–31)
COLOR SPEC: YELLOW — SIGNIFICANT CHANGE UP
CREAT SERPL-MCNC: 0.56 MG/DL — SIGNIFICANT CHANGE UP (ref 0.5–1.3)
DIFF PNL FLD: NEGATIVE — SIGNIFICANT CHANGE UP
EGFR: 121 ML/MIN/1.73M2 — SIGNIFICANT CHANGE UP
GLUCOSE SERPL-MCNC: 65 MG/DL — LOW (ref 70–99)
GLUCOSE UR QL: NEGATIVE — SIGNIFICANT CHANGE UP
HCT VFR BLD CALC: 35.2 % — SIGNIFICANT CHANGE UP (ref 34.5–45)
HGB BLD-MCNC: 11.1 G/DL — LOW (ref 11.5–15.5)
KETONES UR-MCNC: NEGATIVE — SIGNIFICANT CHANGE UP
LEUKOCYTE ESTERASE UR-ACNC: NEGATIVE — SIGNIFICANT CHANGE UP
MCHC RBC-ENTMCNC: 26.7 PG — LOW (ref 27–34)
MCHC RBC-ENTMCNC: 31.5 GM/DL — LOW (ref 32–36)
MCV RBC AUTO: 84.6 FL — SIGNIFICANT CHANGE UP (ref 80–100)
NITRITE UR-MCNC: NEGATIVE — SIGNIFICANT CHANGE UP
NRBC # BLD: 0 /100 WBCS — SIGNIFICANT CHANGE UP
NRBC # FLD: 0 K/UL — SIGNIFICANT CHANGE UP
PH UR: 8 — SIGNIFICANT CHANGE UP (ref 5–8)
PLATELET # BLD AUTO: 296 K/UL — SIGNIFICANT CHANGE UP (ref 150–400)
POTASSIUM SERPL-MCNC: 3.9 MMOL/L — SIGNIFICANT CHANGE UP (ref 3.5–5.3)
POTASSIUM SERPL-SCNC: 3.9 MMOL/L — SIGNIFICANT CHANGE UP (ref 3.5–5.3)
PROT UR-MCNC: ABNORMAL
RBC # BLD: 4.16 M/UL — SIGNIFICANT CHANGE UP (ref 3.8–5.2)
RBC # FLD: 13.9 % — SIGNIFICANT CHANGE UP (ref 10.3–14.5)
RH IG SCN BLD-IMP: POSITIVE — SIGNIFICANT CHANGE UP
SODIUM SERPL-SCNC: 134 MMOL/L — LOW (ref 135–145)
SP GR SPEC: 1.02 — SIGNIFICANT CHANGE UP (ref 1–1.05)
UROBILINOGEN FLD QL: SIGNIFICANT CHANGE UP
WBC # BLD: 8.78 K/UL — SIGNIFICANT CHANGE UP (ref 3.8–10.5)
WBC # FLD AUTO: 8.78 K/UL — SIGNIFICANT CHANGE UP (ref 3.8–10.5)

## 2022-07-14 RX ORDER — SODIUM CHLORIDE 9 MG/ML
1000 INJECTION, SOLUTION INTRAVENOUS
Refills: 0 | Status: DISCONTINUED | OUTPATIENT
Start: 2022-07-28 | End: 2022-07-29

## 2022-07-14 NOTE — OB PST NOTE - NSICDXFAMILYHX_GEN_ALL_CORE_FT
FAMILY HISTORY:  Family history of diabetes mellitus (DM)  Family history of hypertension    Sibling  Still living? Yes, Estimated age: 21-30  Family history of gout, Age at diagnosis: Age Unknown

## 2022-07-14 NOTE — OB PST NOTE - PROBLEM SELECTOR PLAN 1
Patient tentatively scheduled for repeat  section on 2022.  Pre-op instructions provided. Pt given verbal and written instructions with teach back on chlorhexidine wash . Pt verbalized understanding with return demonstration.     Pt strongly advised  for  COVID testing requirements to be done  3- 5 days prior to procedure. Pt was provided with information for covid testing locations in written form.   Pt verbalized understanding with return demonstration.    Pt advised to follow OB for all  medication instructions .  Pt was given education to watch video on pain management, drink Gatorade before coming to hospital and covid  test for their partner.   Pt verbalized understanding with return demonstration

## 2022-07-14 NOTE — OB PST NOTE - HISTORY OF PRESENT ILLNESS
37 year old pregnant female presents to presurgical testing scheduled for Repeat  Caesarean section.  Patient denies vaginal  bleeding, spotting or leakage of amniotic fluid. Patient denies regular contractions.  Patient reports positive fetal movement.

## 2022-07-14 NOTE — OB PST NOTE - NSICDXPASTMEDICALHX_GEN_ALL_CORE_FT
PAST MEDICAL HISTORY:  Breech presentation, antepartum     Gestational hypertension     No pertinent past medical history     Pregnancy induced hypertension

## 2022-07-14 NOTE — OB PST NOTE - NSHPPHYSICALEXAM_GEN_ALL_CORE
Constitutional: well developed, well groomed, well nourished, no distress    Eyes: PERRL, EOMI, conjunctiva clear    Ears: normal    Mouth and Gums: normal, moist    Pharynx: no tenderness, discharge,    Tonsils: no redness, discharge, tenderness, or swelling    Neck: supple, no JVD, normal thyroid gland,    Breast: normal shape    Back: normal shape, ROM intact, strength intact    Respiratory: airway patent, breast sounds equal, good air movement, respiration non-labored, clear to auscultation bilaterally, no chest wall tenderness, no wheezes, rhonchi, or rales    Cardiovascular: regular rate and rhythm    Gastrointestinal: gravid abdomen, non tender, normal bowel sounds    Extremities: no clubbing, cyanosis, or pedal edema    Vascular: carotid pulse normal, radial pulse normal,     Neurological: alert and oriented x3, sensation intact, normal strength    Skin: warm and dry, normal color    Lymph nodes: no anterior cervical lymphadenopathy    Musculoskeletal: ROM intact, normal strength, no joint swelling, warmth, or calf tenderness    Psychiatric: normal affect, normal behavior

## 2022-07-14 NOTE — OB PST NOTE - NSANTHOSAYNRD_GEN_A_CORE
No. KAYLAH screening performed.  STOP BANG Legend: 0-2 = LOW Risk; 3-4 = INTERMEDIATE Risk; 5-8 = HIGH Risk

## 2022-07-27 ENCOUNTER — TRANSCRIPTION ENCOUNTER (OUTPATIENT)
Age: 37
End: 2022-07-27

## 2022-07-28 ENCOUNTER — RESULT REVIEW (OUTPATIENT)
Age: 37
End: 2022-07-28

## 2022-07-28 ENCOUNTER — INPATIENT (INPATIENT)
Facility: HOSPITAL | Age: 37
LOS: 1 days | Discharge: HOME | End: 2022-07-30
Attending: OBSTETRICS & GYNECOLOGY | Admitting: OBSTETRICS & GYNECOLOGY

## 2022-07-28 VITALS — DIASTOLIC BLOOD PRESSURE: 100 MMHG | SYSTOLIC BLOOD PRESSURE: 138 MMHG | HEART RATE: 96 BPM

## 2022-07-28 LAB
ALBUMIN SERPL ELPH-MCNC: 3 G/DL — LOW (ref 3.3–5)
ALBUMIN SERPL ELPH-MCNC: 3.7 G/DL — SIGNIFICANT CHANGE UP (ref 3.3–5)
ALP SERPL-CCNC: 102 U/L — SIGNIFICANT CHANGE UP (ref 40–120)
ALP SERPL-CCNC: 130 U/L — HIGH (ref 40–120)
ALT FLD-CCNC: 10 U/L — SIGNIFICANT CHANGE UP (ref 4–33)
ALT FLD-CCNC: 13 U/L — SIGNIFICANT CHANGE UP (ref 4–33)
ANION GAP SERPL CALC-SCNC: 10 MMOL/L — SIGNIFICANT CHANGE UP (ref 7–14)
ANION GAP SERPL CALC-SCNC: 13 MMOL/L — SIGNIFICANT CHANGE UP (ref 7–14)
APTT BLD: 24.2 SEC — LOW (ref 27–36.3)
APTT BLD: 25.4 SEC — LOW (ref 27–36.3)
AST SERPL-CCNC: 23 U/L — SIGNIFICANT CHANGE UP (ref 4–32)
AST SERPL-CCNC: 23 U/L — SIGNIFICANT CHANGE UP (ref 4–32)
BASOPHILS # BLD AUTO: 0.03 K/UL — SIGNIFICANT CHANGE UP (ref 0–0.2)
BASOPHILS # BLD AUTO: 0.03 K/UL — SIGNIFICANT CHANGE UP (ref 0–0.2)
BASOPHILS NFR BLD AUTO: 0.2 % — SIGNIFICANT CHANGE UP (ref 0–2)
BASOPHILS NFR BLD AUTO: 0.3 % — SIGNIFICANT CHANGE UP (ref 0–2)
BILIRUB SERPL-MCNC: 0.3 MG/DL — SIGNIFICANT CHANGE UP (ref 0.2–1.2)
BILIRUB SERPL-MCNC: 0.4 MG/DL — SIGNIFICANT CHANGE UP (ref 0.2–1.2)
BLD GP AB SCN SERPL QL: NEGATIVE — SIGNIFICANT CHANGE UP
BUN SERPL-MCNC: 8 MG/DL — SIGNIFICANT CHANGE UP (ref 7–23)
BUN SERPL-MCNC: 9 MG/DL — SIGNIFICANT CHANGE UP (ref 7–23)
CALCIUM SERPL-MCNC: 8.6 MG/DL — SIGNIFICANT CHANGE UP (ref 8.4–10.5)
CALCIUM SERPL-MCNC: 9.3 MG/DL — SIGNIFICANT CHANGE UP (ref 8.4–10.5)
CHLORIDE SERPL-SCNC: 103 MMOL/L — SIGNIFICANT CHANGE UP (ref 98–107)
CHLORIDE SERPL-SCNC: 103 MMOL/L — SIGNIFICANT CHANGE UP (ref 98–107)
CO2 SERPL-SCNC: 20 MMOL/L — LOW (ref 22–31)
CO2 SERPL-SCNC: 23 MMOL/L — SIGNIFICANT CHANGE UP (ref 22–31)
COVID-19 SPIKE DOMAIN AB INTERP: POSITIVE
COVID-19 SPIKE DOMAIN ANTIBODY RESULT: >250 U/ML — HIGH
CREAT SERPL-MCNC: 0.52 MG/DL — SIGNIFICANT CHANGE UP (ref 0.5–1.3)
CREAT SERPL-MCNC: 0.54 MG/DL — SIGNIFICANT CHANGE UP (ref 0.5–1.3)
EGFR: 122 ML/MIN/1.73M2 — SIGNIFICANT CHANGE UP
EGFR: 123 ML/MIN/1.73M2 — SIGNIFICANT CHANGE UP
EOSINOPHIL # BLD AUTO: 0.01 K/UL — SIGNIFICANT CHANGE UP (ref 0–0.5)
EOSINOPHIL # BLD AUTO: 0.08 K/UL — SIGNIFICANT CHANGE UP (ref 0–0.5)
EOSINOPHIL NFR BLD AUTO: 0.1 % — SIGNIFICANT CHANGE UP (ref 0–6)
EOSINOPHIL NFR BLD AUTO: 0.8 % — SIGNIFICANT CHANGE UP (ref 0–6)
FIBRINOGEN PPP-MCNC: 481 MG/DL — SIGNIFICANT CHANGE UP (ref 330–520)
FIBRINOGEN PPP-MCNC: 538 MG/DL — HIGH (ref 330–520)
GLUCOSE SERPL-MCNC: 85 MG/DL — SIGNIFICANT CHANGE UP (ref 70–99)
GLUCOSE SERPL-MCNC: 98 MG/DL — SIGNIFICANT CHANGE UP (ref 70–99)
HCT VFR BLD CALC: 31.9 % — LOW (ref 34.5–45)
HCT VFR BLD CALC: 36.4 % — SIGNIFICANT CHANGE UP (ref 34.5–45)
HGB BLD-MCNC: 11.5 G/DL — SIGNIFICANT CHANGE UP (ref 11.5–15.5)
HGB BLD-MCNC: 9.9 G/DL — LOW (ref 11.5–15.5)
IANC: 16.81 K/UL — HIGH (ref 1.8–7.4)
IANC: 6.92 K/UL — SIGNIFICANT CHANGE UP (ref 1.8–7.4)
IMM GRANULOCYTES NFR BLD AUTO: 0.5 % — SIGNIFICANT CHANGE UP (ref 0–1.5)
IMM GRANULOCYTES NFR BLD AUTO: 0.6 % — SIGNIFICANT CHANGE UP (ref 0–1.5)
INR BLD: 0.92 RATIO — SIGNIFICANT CHANGE UP (ref 0.88–1.16)
INR BLD: 1.01 RATIO — SIGNIFICANT CHANGE UP (ref 0.88–1.16)
LDH SERPL L TO P-CCNC: 205 U/L — SIGNIFICANT CHANGE UP (ref 135–225)
LDH SERPL L TO P-CCNC: 270 U/L — HIGH (ref 135–225)
LYMPHOCYTES # BLD AUTO: 1.43 K/UL — SIGNIFICANT CHANGE UP (ref 1–3.3)
LYMPHOCYTES # BLD AUTO: 2.64 K/UL — SIGNIFICANT CHANGE UP (ref 1–3.3)
LYMPHOCYTES # BLD AUTO: 25.1 % — SIGNIFICANT CHANGE UP (ref 13–44)
LYMPHOCYTES # BLD AUTO: 7.4 % — LOW (ref 13–44)
MCHC RBC-ENTMCNC: 26.6 PG — LOW (ref 27–34)
MCHC RBC-ENTMCNC: 26.8 PG — LOW (ref 27–34)
MCHC RBC-ENTMCNC: 31 GM/DL — LOW (ref 32–36)
MCHC RBC-ENTMCNC: 31.6 GM/DL — LOW (ref 32–36)
MCV RBC AUTO: 84.1 FL — SIGNIFICANT CHANGE UP (ref 80–100)
MCV RBC AUTO: 86.2 FL — SIGNIFICANT CHANGE UP (ref 80–100)
MONOCYTES # BLD AUTO: 0.78 K/UL — SIGNIFICANT CHANGE UP (ref 0–0.9)
MONOCYTES # BLD AUTO: 0.91 K/UL — HIGH (ref 0–0.9)
MONOCYTES NFR BLD AUTO: 4.7 % — SIGNIFICANT CHANGE UP (ref 2–14)
MONOCYTES NFR BLD AUTO: 7.4 % — SIGNIFICANT CHANGE UP (ref 2–14)
NEUTROPHILS # BLD AUTO: 16.81 K/UL — HIGH (ref 1.8–7.4)
NEUTROPHILS # BLD AUTO: 6.92 K/UL — SIGNIFICANT CHANGE UP (ref 1.8–7.4)
NEUTROPHILS NFR BLD AUTO: 65.8 % — SIGNIFICANT CHANGE UP (ref 43–77)
NEUTROPHILS NFR BLD AUTO: 87.1 % — HIGH (ref 43–77)
NRBC # BLD: 0 /100 WBCS — SIGNIFICANT CHANGE UP
NRBC # BLD: 0 /100 WBCS — SIGNIFICANT CHANGE UP
NRBC # FLD: 0 K/UL — SIGNIFICANT CHANGE UP
NRBC # FLD: 0 K/UL — SIGNIFICANT CHANGE UP
PLATELET # BLD AUTO: 287 K/UL — SIGNIFICANT CHANGE UP (ref 150–400)
PLATELET # BLD AUTO: 297 K/UL — SIGNIFICANT CHANGE UP (ref 150–400)
POTASSIUM SERPL-MCNC: 4.3 MMOL/L — SIGNIFICANT CHANGE UP (ref 3.5–5.3)
POTASSIUM SERPL-MCNC: 4.6 MMOL/L — SIGNIFICANT CHANGE UP (ref 3.5–5.3)
POTASSIUM SERPL-SCNC: 4.3 MMOL/L — SIGNIFICANT CHANGE UP (ref 3.5–5.3)
POTASSIUM SERPL-SCNC: 4.6 MMOL/L — SIGNIFICANT CHANGE UP (ref 3.5–5.3)
PROT SERPL-MCNC: 5.9 G/DL — LOW (ref 6–8.3)
PROT SERPL-MCNC: 6.9 G/DL — SIGNIFICANT CHANGE UP (ref 6–8.3)
PROTHROM AB SERPL-ACNC: 10.7 SEC — SIGNIFICANT CHANGE UP (ref 10.5–13.4)
PROTHROM AB SERPL-ACNC: 11.7 SEC — SIGNIFICANT CHANGE UP (ref 10.5–13.4)
RBC # BLD: 3.7 M/UL — LOW (ref 3.8–5.2)
RBC # BLD: 4.33 M/UL — SIGNIFICANT CHANGE UP (ref 3.8–5.2)
RBC # FLD: 13.8 % — SIGNIFICANT CHANGE UP (ref 10.3–14.5)
RBC # FLD: 14.2 % — SIGNIFICANT CHANGE UP (ref 10.3–14.5)
RH IG SCN BLD-IMP: POSITIVE — SIGNIFICANT CHANGE UP
SARS-COV-2 IGG+IGM SERPL QL IA: >250 U/ML — HIGH
SARS-COV-2 IGG+IGM SERPL QL IA: POSITIVE
SARS-COV-2 RNA SPEC QL NAA+PROBE: SIGNIFICANT CHANGE UP
SODIUM SERPL-SCNC: 136 MMOL/L — SIGNIFICANT CHANGE UP (ref 135–145)
SODIUM SERPL-SCNC: 136 MMOL/L — SIGNIFICANT CHANGE UP (ref 135–145)
T PALLIDUM AB TITR SER: NEGATIVE — SIGNIFICANT CHANGE UP
URATE SERPL-MCNC: 3.5 MG/DL — SIGNIFICANT CHANGE UP (ref 2.5–7)
URATE SERPL-MCNC: 4.2 MG/DL — SIGNIFICANT CHANGE UP (ref 2.5–7)
WBC # BLD: 10.51 K/UL — HIGH (ref 3.8–10.5)
WBC # BLD: 19.29 K/UL — HIGH (ref 3.8–10.5)
WBC # FLD AUTO: 10.51 K/UL — HIGH (ref 3.8–10.5)
WBC # FLD AUTO: 19.29 K/UL — HIGH (ref 3.8–10.5)

## 2022-07-28 PROCEDURE — 88305 TISSUE EXAM BY PATHOLOGIST: CPT | Mod: 26

## 2022-07-28 DEVICE — INTERCEED 3 X 4": Type: IMPLANTABLE DEVICE | Status: FUNCTIONAL

## 2022-07-28 DEVICE — SURGICEL SNOW 2 X 4": Type: IMPLANTABLE DEVICE | Status: FUNCTIONAL

## 2022-07-28 RX ORDER — FAMOTIDINE 10 MG/ML
20 INJECTION INTRAVENOUS ONCE
Refills: 0 | Status: COMPLETED | OUTPATIENT
Start: 2022-07-28 | End: 2022-07-28

## 2022-07-28 RX ORDER — OXYTOCIN 10 UNIT/ML
333.33 VIAL (ML) INJECTION
Qty: 20 | Refills: 0 | Status: DISCONTINUED | OUTPATIENT
Start: 2022-07-28 | End: 2022-07-29

## 2022-07-28 RX ORDER — ACETAMINOPHEN 500 MG
975 TABLET ORAL
Refills: 0 | Status: DISCONTINUED | OUTPATIENT
Start: 2022-07-28 | End: 2022-07-30

## 2022-07-28 RX ORDER — KETOROLAC TROMETHAMINE 30 MG/ML
30 SYRINGE (ML) INJECTION EVERY 6 HOURS
Refills: 0 | Status: DISCONTINUED | OUTPATIENT
Start: 2022-07-28 | End: 2022-07-29

## 2022-07-28 RX ORDER — ACETAMINOPHEN 500 MG
1000 TABLET ORAL ONCE
Refills: 0 | Status: COMPLETED | OUTPATIENT
Start: 2022-07-28 | End: 2022-07-28

## 2022-07-28 RX ORDER — SODIUM CHLORIDE 9 MG/ML
1000 INJECTION, SOLUTION INTRAVENOUS
Refills: 0 | Status: DISCONTINUED | OUTPATIENT
Start: 2022-07-28 | End: 2022-07-29

## 2022-07-28 RX ORDER — ERTAPENEM SODIUM 1 G/1
1000 INJECTION, POWDER, LYOPHILIZED, FOR SOLUTION INTRAMUSCULAR; INTRAVENOUS EVERY 24 HOURS
Refills: 0 | Status: DISCONTINUED | OUTPATIENT
Start: 2022-07-29 | End: 2022-07-30

## 2022-07-28 RX ORDER — ERTAPENEM SODIUM 1 G/1
1000 INJECTION, POWDER, LYOPHILIZED, FOR SOLUTION INTRAMUSCULAR; INTRAVENOUS ONCE
Refills: 0 | Status: COMPLETED | OUTPATIENT
Start: 2022-07-28 | End: 2022-07-28

## 2022-07-28 RX ORDER — MAGNESIUM HYDROXIDE 400 MG/1
30 TABLET, CHEWABLE ORAL
Refills: 0 | Status: DISCONTINUED | OUTPATIENT
Start: 2022-07-28 | End: 2022-07-30

## 2022-07-28 RX ORDER — LABETALOL HCL 100 MG
200 TABLET ORAL DAILY
Refills: 0 | Status: DISCONTINUED | OUTPATIENT
Start: 2022-07-28 | End: 2022-07-30

## 2022-07-28 RX ORDER — ERTAPENEM SODIUM 1 G/1
INJECTION, POWDER, LYOPHILIZED, FOR SOLUTION INTRAMUSCULAR; INTRAVENOUS
Refills: 0 | Status: DISCONTINUED | OUTPATIENT
Start: 2022-07-28 | End: 2022-07-30

## 2022-07-28 RX ORDER — SIMETHICONE 80 MG/1
80 TABLET, CHEWABLE ORAL EVERY 4 HOURS
Refills: 0 | Status: DISCONTINUED | OUTPATIENT
Start: 2022-07-28 | End: 2022-07-30

## 2022-07-28 RX ORDER — OXYCODONE HYDROCHLORIDE 5 MG/1
5 TABLET ORAL ONCE
Refills: 0 | Status: DISCONTINUED | OUTPATIENT
Start: 2022-07-28 | End: 2022-07-30

## 2022-07-28 RX ORDER — DIPHENHYDRAMINE HCL 50 MG
25 CAPSULE ORAL EVERY 6 HOURS
Refills: 0 | Status: DISCONTINUED | OUTPATIENT
Start: 2022-07-28 | End: 2022-07-30

## 2022-07-28 RX ORDER — OXYCODONE HYDROCHLORIDE 5 MG/1
5 TABLET ORAL
Refills: 0 | Status: DISCONTINUED | OUTPATIENT
Start: 2022-07-28 | End: 2022-07-30

## 2022-07-28 RX ORDER — HEPARIN SODIUM 5000 [USP'U]/ML
5000 INJECTION INTRAVENOUS; SUBCUTANEOUS EVERY 12 HOURS
Refills: 0 | Status: DISCONTINUED | OUTPATIENT
Start: 2022-07-28 | End: 2022-07-30

## 2022-07-28 RX ORDER — CITRIC ACID/SODIUM CITRATE 300-500 MG
30 SOLUTION, ORAL ORAL ONCE
Refills: 0 | Status: COMPLETED | OUTPATIENT
Start: 2022-07-28 | End: 2022-07-28

## 2022-07-28 RX ORDER — IBUPROFEN 200 MG
600 TABLET ORAL EVERY 6 HOURS
Refills: 0 | Status: COMPLETED | OUTPATIENT
Start: 2022-07-28 | End: 2023-06-26

## 2022-07-28 RX ORDER — LANOLIN
1 OINTMENT (GRAM) TOPICAL EVERY 6 HOURS
Refills: 0 | Status: DISCONTINUED | OUTPATIENT
Start: 2022-07-28 | End: 2022-07-30

## 2022-07-28 RX ORDER — LABETALOL HCL 100 MG
300 TABLET ORAL DAILY
Refills: 0 | Status: DISCONTINUED | OUTPATIENT
Start: 2022-07-29 | End: 2022-07-30

## 2022-07-28 RX ORDER — SODIUM CHLORIDE 9 MG/ML
1000 INJECTION, SOLUTION INTRAVENOUS ONCE
Refills: 0 | Status: COMPLETED | OUTPATIENT
Start: 2022-07-28 | End: 2022-07-28

## 2022-07-28 RX ORDER — TETANUS TOXOID, REDUCED DIPHTHERIA TOXOID AND ACELLULAR PERTUSSIS VACCINE, ADSORBED 5; 2.5; 8; 8; 2.5 [IU]/.5ML; [IU]/.5ML; UG/.5ML; UG/.5ML; UG/.5ML
0.5 SUSPENSION INTRAMUSCULAR ONCE
Refills: 0 | Status: DISCONTINUED | OUTPATIENT
Start: 2022-07-28 | End: 2022-07-30

## 2022-07-28 RX ADMIN — SODIUM CHLORIDE 125 MILLILITER(S): 9 INJECTION, SOLUTION INTRAVENOUS at 07:15

## 2022-07-28 RX ADMIN — ERTAPENEM SODIUM 120 MILLIGRAM(S): 1 INJECTION, POWDER, LYOPHILIZED, FOR SOLUTION INTRAMUSCULAR; INTRAVENOUS at 11:32

## 2022-07-28 RX ADMIN — Medication 30 MILLILITER(S): at 07:15

## 2022-07-28 RX ADMIN — Medication 30 MILLIGRAM(S): at 22:03

## 2022-07-28 RX ADMIN — Medication 1000 MILLIUNIT(S)/MIN: at 12:18

## 2022-07-28 RX ADMIN — Medication 30 MILLIGRAM(S): at 21:33

## 2022-07-28 RX ADMIN — Medication 1000 MILLIGRAM(S): at 12:46

## 2022-07-28 RX ADMIN — Medication 200 MILLIGRAM(S): at 22:02

## 2022-07-28 RX ADMIN — SODIUM CHLORIDE 2000 MILLILITER(S): 9 INJECTION, SOLUTION INTRAVENOUS at 07:14

## 2022-07-28 RX ADMIN — HEPARIN SODIUM 5000 UNIT(S): 5000 INJECTION INTRAVENOUS; SUBCUTANEOUS at 18:41

## 2022-07-28 RX ADMIN — SODIUM CHLORIDE 75 MILLILITER(S): 9 INJECTION, SOLUTION INTRAVENOUS at 12:18

## 2022-07-28 RX ADMIN — Medication 400 MILLIGRAM(S): at 12:26

## 2022-07-28 RX ADMIN — FAMOTIDINE 20 MILLIGRAM(S): 10 INJECTION INTRAVENOUS at 07:15

## 2022-07-28 RX ADMIN — Medication 975 MILLIGRAM(S): at 18:41

## 2022-07-28 NOTE — OB RN DELIVERY SUMMARY - NS_SEPSISRSKCALC_OBGYN_ALL_OB_FT
EOS calculated successfully. EOS Risk Factor: 0.5/1000 live births ([facility] national incidence); GA=37w2d; Temp=98.42; ROM=0.033; GBS='Unknown'; Antibiotics='No antibiotics or any antibiotics < 2 hrs prior to birth'

## 2022-07-28 NOTE — OB PROVIDER H&P - ASSESSMENT
A&P: 37 year old  @ 37+2 presents for repeat lower transverse  section. Pregnancy c/b cHTN. No complaints today.   Labor: admit to L&D  -repeat lower transverse  section  -routine labs  -EFM/toco  -NPO, IV hydration  Fetal: cat 1 tracing, fetal status reassuring  GBS: neg  Analgesia: spinal      d/w dr mario barber pgy2   A&P: 37 year old  @ 37+2 presents for repeat lower transverse  section. Pregnancy c/b cHTN. No complaints today.   Labor: admit to L&D  -repeat lower transverse  section  -routine labs  -EFM/toco  -NPO, IV hydration  Fetal: cat 1 tracing, fetal status reassuring  GBS: neg  Analgesia: spinal      d/w dr mario barber pgy2      Agree with above.   disc with pt  All questions answered

## 2022-07-28 NOTE — OB PROVIDER H&P - HISTORY OF PRESENT ILLNESS
R2 H&P    Pt is a 36y/o  at 37+2 admitted for rLTCS. No complaints today. +FM, -LOF, -VB, -Cx  Prenatal course c/b cHTN. Denies HA, changes in vision, RUQ pain, epigastric pain   GBS neg  EFW ~6lb    OBHx:    pLTCS 2/2 breech presentation c/b HTN 8lb 12oz   SAB s/p D+C  GynHx: +Fibroids, -Abnormal paps, -Cysts, -STIs  PMHx: cHTN  PSHx: C/S x1, D+C x1  Med: PNV, Lab 300mg AM, 200mg PM  All: NKDA  SH: denies toxic habits x3

## 2022-07-28 NOTE — OB RN DELIVERY SUMMARY - NSSELHIDDEN_OBGYN_ALL_OB_FT
[NS_DeliveryAttending1_OBGYN_ALL_OB_FT:[id number]==],[NS_DeliveryAssist1_OBGYN_ALL_OB_FT:[id number]],[NS_DeliveryRN_OBGYN_ALL_OB_FT:[id number]]

## 2022-07-28 NOTE — OB PROVIDER DELIVERY SUMMARY - NSSELHIDDEN_OBGYN_ALL_OB_FT
[NS_DeliveryAttending1_OBGYN_ALL_OB_FT:[id number]==],[NS_DeliveryAssist1_OBGYN_ALL_OB_FT:[id number]=],[NS_DeliveryRN_OBGYN_ALL_OB_FT:[id number]=]

## 2022-07-28 NOTE — OB RN INTRAOPERATIVE NOTE - NS_IMPLANTS_OBGYN_ALL_OB
None Interceed Absorb Adhesion/Surgicel Hemostat Interceed Absorb Adhesion/Surgicel Hemostat/Surgicel Snow

## 2022-07-28 NOTE — OB RN PATIENT PROFILE - FALL HARM RISK - UNIVERSAL INTERVENTIONS
Bed in lowest position, wheels locked, appropriate side rails in place/Call bell, personal items and telephone in reach/Instruct patient to call for assistance before getting out of bed or chair/Non-slip footwear when patient is out of bed/[place] to call system/Physically safe environment - no spills, clutter or unnecessary equipment/Purposeful Proactive Rounding/Room/bathroom lighting operational, light cord in reach

## 2022-07-28 NOTE — OB RN PATIENT PROFILE - TERM DELIVERIES, OB PROFILE
Patient requesting refill on requip 0.5mg 2 tablets at bedtime  
Refill provided  
Spoke to patient.  
1

## 2022-07-28 NOTE — OB RN INTRAOPERATIVE NOTE - NSSELHIDDEN_OBGYN_ALL_OB_FT
[NS_DeliveryAttending1_OBGYN_ALL_OB_FT:[id number]],[NS_DeliveryAssist1_OBGYN_ALL_OB_FT:[id number]=],[NS_DeliveryRN_OBGYN_ALL_OB_FT:[id number]=]

## 2022-07-28 NOTE — OB PROVIDER H&P - NSHPPHYSICALEXAM_GEN_ALL_CORE
VS:  Vital Signs Last 24 Hrs  T(C): 36.9 (28 Jul 2022 05:49), Max: 36.9 (28 Jul 2022 05:49)  T(F): 98.42 (28 Jul 2022 05:49), Max: 98.42 (28 Jul 2022 05:49)  HR: 86 (28 Jul 2022 06:49) (81 - 96)  BP: 147/93 (28 Jul 2022 06:49) (133/92 - 147/93)  BP(mean): --  RR: 14 (28 Jul 2022 05:49) (14 - 14)  SpO2: --  GA: NAD  Cards: RRR  Pulm: CTAB  EFH: cat 1  [place]: not kenneth  VE: deferred  TAUS: vertex

## 2022-07-28 NOTE — OB PROVIDER DELIVERY SUMMARY - NSPROVIDERDELIVERYNOTE_OBGYN_ALL_OB_FT
viable male infant, cephalic presentation, weight 2950g, APGARS 9/9  grossly normal uterus b/l tubes and ovaries  rectus muscle densely adherent to fascia  peritoneum densely adherent to the anterior aspect of the uterus  hysterotomy closed in 3 layers. Additional figure 8s placed on the left side of the hysterotomy in order to achieve hemostasis.   bleeding noted on the anterior superior aspect of the uterus 2/2 the dense adhesions of the peritoneum. adhesions taken down with bovie. multiple figure 8 and mattress sutures were placed to achieve hemostasis.   surgicel placed over the hysterotomy and anterior superior aspect of the uterus  rectus muscle noted to be friable, interseed placed over the rectus muscle    1325/2300/190    Dictation #: [id number] viable male infant, cephalic presentation, weight 2950g, APGARS 9/9  grossly normal uterus b/l tubes and ovaries  rectus muscle densely adherent to fascia  peritoneum densely adherent to the anterior aspect of the uterus  hysterotomy closed in 3 layers. Additional figure 8s placed on the left side of the hysterotomy in order to achieve hemostasis.   bleeding noted on the anterior superior aspect of the uterus 2/2 the dense adhesions of the peritoneum. adhesions taken down with bovie. multiple figure 8 and mattress sutures were placed to achieve hemostasis.   surgicel placed over the hysterotomy and anterior superior aspect of the uterus  rectus muscle noted to be friable, interseed placed over the rectus muscle  bladder backfilled with methylene blue, no extravasation of fluid noted    1325/2300/190    Dictation #: [id number] viable male infant, cephalic presentation, weight 2950g, APGARS 9/9  grossly normal uterus b/l tubes and ovaries  rectus muscle densely adherent to fascia  peritoneum densely adherent to the anterior aspect of the uterus  hysterotomy closed in 3 layers. Additional figure 8s placed on the left side of the hysterotomy in order to achieve hemostasis.   bleeding noted on the anterior superior aspect of the uterus 2/2 the dense adhesions of the peritoneum. adhesions taken down with bovie. multiple figure 8 and mattress sutures were placed to achieve hemostasis.   surgicel placed over the hysterotomy and anterior superior aspect of the uterus  rectus muscle noted to be friable, interceed placed over the rectus muscle  bladder backfilled with methylene blue, no extravasation of fluid noted    1325/2300/190    Dictation #: [id number]

## 2022-07-29 ENCOUNTER — TRANSCRIPTION ENCOUNTER (OUTPATIENT)
Age: 37
End: 2022-07-29

## 2022-07-29 LAB
BASOPHILS # BLD AUTO: 0.03 K/UL — SIGNIFICANT CHANGE UP (ref 0–0.2)
BASOPHILS NFR BLD AUTO: 0.2 % — SIGNIFICANT CHANGE UP (ref 0–2)
EOSINOPHIL # BLD AUTO: 0.03 K/UL — SIGNIFICANT CHANGE UP (ref 0–0.5)
EOSINOPHIL NFR BLD AUTO: 0.2 % — SIGNIFICANT CHANGE UP (ref 0–6)
HCT VFR BLD CALC: 26 % — LOW (ref 34.5–45)
HGB BLD-MCNC: 8.2 G/DL — LOW (ref 11.5–15.5)
IANC: 11.23 K/UL — HIGH (ref 1.8–7.4)
IMM GRANULOCYTES NFR BLD AUTO: 0.5 % — SIGNIFICANT CHANGE UP (ref 0–1.5)
LYMPHOCYTES # BLD AUTO: 18.5 % — SIGNIFICANT CHANGE UP (ref 13–44)
LYMPHOCYTES # BLD AUTO: 2.81 K/UL — SIGNIFICANT CHANGE UP (ref 1–3.3)
MCHC RBC-ENTMCNC: 27.1 PG — SIGNIFICANT CHANGE UP (ref 27–34)
MCHC RBC-ENTMCNC: 31.5 GM/DL — LOW (ref 32–36)
MCV RBC AUTO: 85.8 FL — SIGNIFICANT CHANGE UP (ref 80–100)
MONOCYTES # BLD AUTO: 1.05 K/UL — HIGH (ref 0–0.9)
MONOCYTES NFR BLD AUTO: 6.9 % — SIGNIFICANT CHANGE UP (ref 2–14)
NEUTROPHILS # BLD AUTO: 11.23 K/UL — HIGH (ref 1.8–7.4)
NEUTROPHILS NFR BLD AUTO: 73.7 % — SIGNIFICANT CHANGE UP (ref 43–77)
NRBC # BLD: 0 /100 WBCS — SIGNIFICANT CHANGE UP
NRBC # FLD: 0 K/UL — SIGNIFICANT CHANGE UP
PLATELET # BLD AUTO: 238 K/UL — SIGNIFICANT CHANGE UP (ref 150–400)
RBC # BLD: 3.03 M/UL — LOW (ref 3.8–5.2)
RBC # FLD: 14.1 % — SIGNIFICANT CHANGE UP (ref 10.3–14.5)
WBC # BLD: 15.23 K/UL — HIGH (ref 3.8–10.5)
WBC # FLD AUTO: 15.23 K/UL — HIGH (ref 3.8–10.5)

## 2022-07-29 RX ORDER — IBUPROFEN 200 MG
1 TABLET ORAL
Qty: 0 | Refills: 0 | DISCHARGE
Start: 2022-07-29

## 2022-07-29 RX ORDER — CHOLECALCIFEROL (VITAMIN D3) 125 MCG
1 CAPSULE ORAL
Qty: 0 | Refills: 0 | DISCHARGE

## 2022-07-29 RX ORDER — IBUPROFEN 200 MG
600 TABLET ORAL EVERY 6 HOURS
Refills: 0 | Status: DISCONTINUED | OUTPATIENT
Start: 2022-07-29 | End: 2022-07-30

## 2022-07-29 RX ORDER — LABETALOL HCL 100 MG
0 TABLET ORAL
Qty: 0 | Refills: 0 | DISCHARGE

## 2022-07-29 RX ORDER — FERROUS SULFATE 325(65) MG
325 TABLET ORAL DAILY
Refills: 0 | Status: DISCONTINUED | OUTPATIENT
Start: 2022-07-29 | End: 2022-07-30

## 2022-07-29 RX ORDER — ASPIRIN/CALCIUM CARB/MAGNESIUM 324 MG
0 TABLET ORAL
Qty: 0 | Refills: 0 | DISCHARGE

## 2022-07-29 RX ORDER — ASCORBIC ACID 60 MG
500 TABLET,CHEWABLE ORAL DAILY
Refills: 0 | Status: DISCONTINUED | OUTPATIENT
Start: 2022-07-29 | End: 2022-07-30

## 2022-07-29 RX ADMIN — Medication 30 MILLIGRAM(S): at 03:24

## 2022-07-29 RX ADMIN — Medication 30 MILLIGRAM(S): at 02:54

## 2022-07-29 RX ADMIN — Medication 600 MILLIGRAM(S): at 11:41

## 2022-07-29 RX ADMIN — Medication 975 MILLIGRAM(S): at 06:13

## 2022-07-29 RX ADMIN — Medication 600 MILLIGRAM(S): at 12:30

## 2022-07-29 RX ADMIN — Medication 200 MILLIGRAM(S): at 20:39

## 2022-07-29 RX ADMIN — HEPARIN SODIUM 5000 UNIT(S): 5000 INJECTION INTRAVENOUS; SUBCUTANEOUS at 05:43

## 2022-07-29 RX ADMIN — Medication 975 MILLIGRAM(S): at 20:39

## 2022-07-29 RX ADMIN — Medication 600 MILLIGRAM(S): at 17:27

## 2022-07-29 RX ADMIN — Medication 975 MILLIGRAM(S): at 00:02

## 2022-07-29 RX ADMIN — Medication 325 MILLIGRAM(S): at 11:41

## 2022-07-29 RX ADMIN — ERTAPENEM SODIUM 120 MILLIGRAM(S): 1 INJECTION, POWDER, LYOPHILIZED, FOR SOLUTION INTRAMUSCULAR; INTRAVENOUS at 11:56

## 2022-07-29 RX ADMIN — Medication 300 MILLIGRAM(S): at 08:31

## 2022-07-29 RX ADMIN — Medication 500 MILLIGRAM(S): at 11:41

## 2022-07-29 RX ADMIN — Medication 975 MILLIGRAM(S): at 21:30

## 2022-07-29 RX ADMIN — Medication 975 MILLIGRAM(S): at 00:32

## 2022-07-29 RX ADMIN — HEPARIN SODIUM 5000 UNIT(S): 5000 INJECTION INTRAVENOUS; SUBCUTANEOUS at 17:28

## 2022-07-29 RX ADMIN — Medication 975 MILLIGRAM(S): at 05:43

## 2022-07-29 NOTE — DISCHARGE NOTE OB - CARE PROVIDER_API CALL
Abigail Perkins)  Obstetrics and Gynecology  [address] [facility], [address]  [place] [address]  Phone: [phone]  Fax: [phone]  Follow Up Time:

## 2022-07-29 NOTE — PROGRESS NOTE ADULT - ASSESSMENT
38y/o  POD#1 from rLTCS c/b PPH. PMH significant for cHTN. H/H 8.2/26.0. Patient recovering well postoperatively.    #PPH  Patient had a QBL of 1325 and Hct dropped as expected 36.4->31.9->26.0.  - Pt asymptomatic and having normal bleeding  - F/u AM CBC    #cHTN  Pt has a history of cHTN and is on Labetalol 500 qD at home.  - Continue Labetalol 300/200 qD  - BPs in the 100-140/50-90s  - Pt asymptomatic    #Postpartum state  - Continue with po analgesia  - Increase ambulation  - Continue regular diet  - Check CBC  - Incision dressing removed  - Continue Invanz x2 days total for prophylaxis  - DVT prophylaxis with 5000u Heparin    Lala Garzon  PGY-1 38y/o  POD#1 from rLTCS c/b PPH. PMH significant for cHTN. H/H 8.2/26.0. Patient recovering well postoperatively.    #PPH  Patient had a QBL of 1325 and Hct dropped as expected 36.4->31.9->26.0.  - Pt asymptomatic and having normal bleeding  - Continue Invanz x2 days total for prophylaxis due to bleeding intraop from adhesions  - F/u AM CBC    #cHTN  Pt has a history of cHTN and is on Labetalol 500 qD at home.  - Continue Labetalol 300/200 qD  - BPs in the 100-140/50-90s  - Pt asymptomatic    #Postpartum state  - Continue with po analgesia  - Increase ambulation  - Continue regular diet  - Check CBC  - Incision dressing removed  - DVT prophylaxis with 5000u Heparin    Lala Garzon  PGY-1

## 2022-07-29 NOTE — DISCHARGE NOTE OB - PATIENT PORTAL LINK FT
You can access the FollowMyHealth Patient Portal offered by [facility] by registering at the following website: [email or web address]. By joining [employer]’s FollowMyHealth portal, you will also be able to view your health information using other applications (apps) compatible with our system.

## 2022-07-29 NOTE — DISCHARGE NOTE OB - CARE PLAN
1 Principal Discharge DX:	Single delivery by  section  Assessment and plan of treatment:	follow up 2 weeks /regular diet & activity as tolerate

## 2022-07-29 NOTE — DISCHARGE NOTE OB - MEDICATION SUMMARY - MEDICATIONS TO TAKE
I will START or STAY ON the medications listed below when I get home from the hospital:    ibuprofen 600 mg oral tablet  -- 1 tab(s) by mouth every 6 hours  -- Indication: For Encounter for  delivery without indication   I will START or STAY ON the medications listed below when I get home from the hospital:    ibuprofen 600 mg oral tablet  -- 1 tab(s) by mouth every 6 hours, As Needed  -- Indication: For C/S    acetaminophen 325 mg oral tablet  -- 3 tab(s) by mouth every 6 hours, As Needed  -- Indication: For Gestational hypertension    labetalol 300 mg oral tablet  -- 1 tab(s) by mouth once a day in the AM  -- Indication: For Single delivery by  section    labetalol 200 mg oral tablet  -- 1 tab(s) by mouth once a day in the PM  -- Indication: For CHTN    Prenatal Multivitamins with Folic Acid 1 mg oral tablet  -- 1 tab(s) by mouth once a day  -- Indication: For C/S

## 2022-07-29 NOTE — DISCHARGE NOTE OB - NS MD DC FALL RISK RISK
For information on Fall & Injury Prevention, visit: [email or web address] OR  [email or web address] OR  https://www.cdc.gov/steadi/patient.html

## 2022-07-30 VITALS
RESPIRATION RATE: 18 BRPM | HEART RATE: 86 BPM | OXYGEN SATURATION: 100 % | DIASTOLIC BLOOD PRESSURE: 72 MMHG | TEMPERATURE: 98 F | SYSTOLIC BLOOD PRESSURE: 123 MMHG

## 2022-07-30 LAB
HCT VFR BLD CALC: 27 % — LOW (ref 34.5–45)
HGB BLD-MCNC: 8.5 G/DL — LOW (ref 11.5–15.5)

## 2022-07-30 RX ORDER — LABETALOL HCL 100 MG
1 TABLET ORAL
Qty: 0 | Refills: 0 | DISCHARGE
Start: 2022-07-30

## 2022-07-30 RX ORDER — ACETAMINOPHEN 500 MG
3 TABLET ORAL
Qty: 0 | Refills: 0 | DISCHARGE
Start: 2022-07-30

## 2022-07-30 RX ADMIN — HEPARIN SODIUM 5000 UNIT(S): 5000 INJECTION INTRAVENOUS; SUBCUTANEOUS at 07:39

## 2022-07-30 RX ADMIN — Medication 300 MILLIGRAM(S): at 09:06

## 2022-07-30 RX ADMIN — Medication 600 MILLIGRAM(S): at 07:39

## 2022-07-30 RX ADMIN — Medication 600 MILLIGRAM(S): at 00:15

## 2022-07-30 NOTE — PROGRESS NOTE ADULT - SUBJECTIVE AND OBJECTIVE BOX
Postpartum Note,  Section  She is a  37y woman who is now post-operative day: 2    Subjective:  The patient feels well.  She is ambulating.   She is tolerating regular diet.  She denies nausea and vomiting.  She is voiding.  Her pain is controlled.  She reports normal postpartum bleeding.    Vital Signs Last 24 Hrs  T(C): 36.6 (2022 06:00), Max: 37.2 (2022 18:46)  T(F): 97.9 (2022 06:00), Max: 98.9 (2022 18:46)  HR: 72 (2022 06:00) (72 - 92)  BP: 113/78 (2022 06:00) (113/78 - 130/69)  BP(mean): --  RR: 18 (2022 06:00) (16 - 18)  SpO2: 100% (2022 06:00) (99% - 100%)    Parameters below as of 2022 06:00  Patient On (Oxygen Delivery Method): room air        Physical exam:  Gen: NAD  Breast: Soft, nontender, not engorged.  Abdomen: Soft, nontender, no distension , firm uterine fundus at umbilicus.  Incision: Clean, dry, and intact with steri strips  Pelvic: Normal lochia noted  Ext: No calf tenderness    LABS:                        8.2    15.23 )-----------( 238      ( 2022 05:18 )             26.0         Allergies    No Known Allergies    Intolerances      MEDICATIONS  (STANDING):  acetaminophen     Tablet .. 975 milliGRAM(s) Oral <User Schedule>  ascorbic acid 500 milliGRAM(s) Oral daily  diphtheria/tetanus/pertussis (acellular) Vaccine (ADAcel) 0.5 milliLiter(s) IntraMuscular once  ertapenem  IVPB      ertapenem  IVPB 1000 milliGRAM(s) IV Intermittent every 24 hours  ferrous    sulfate 325 milliGRAM(s) Oral daily  heparin   Injectable 5000 Unit(s) SubCutaneous every 12 hours  ibuprofen  Tablet. 600 milliGRAM(s) Oral every 6 hours  labetalol 300 milliGRAM(s) Oral daily  labetalol 200 milliGRAM(s) Oral daily  prenatal multivitamin 1 Tablet(s) Oral daily    MEDICATIONS  (PRN):  diphenhydrAMINE 25 milliGRAM(s) Oral every 6 hours PRN Pruritus  lanolin Ointment 1 Application(s) Topical every 6 hours PRN Sore Nipples  magnesium hydroxide Suspension 30 milliLiter(s) Oral two times a day PRN Constipation  oxyCODONE    IR 5 milliGRAM(s) Oral every 3 hours PRN Moderate to Severe Pain (4-10)  oxyCODONE    IR 5 milliGRAM(s) Oral once PRN Moderate to Severe Pain (4-10)  simethicone 80 milliGRAM(s) Chew every 4 hours PRN Gas        Assessment and Plan  POD #2  s/p  section  Doing well.  Encourage ambulation.        
R1 Progress Note    Patient seen and examined at bedside, no acute overnight events. No acute complaints, pain well controlled. Patient is ambulating and has not eaten much yet but is tolerating the food she did eat. Has not yet passed flatus or had a bowel movement. Urinating independently. Denies CP, SOB, N/V, HA, blurred vision, epigastric pain.    Vital Signs Last 24 Hours  T(C): 36.8 (07-29-22 @ 05:32), Max: 37.3 (07-28-22 @ 22:00)  HR: 72 (07-29-22 @ 05:32) (64 - 83)  BP: 117/77 (07-29-22 @ 05:32) (101/54 - 146/89)  RR: 18 (07-29-22 @ 05:32) (12 - 22)  SpO2: 99% (07-29-22 @ 05:32) (97% - 100%)    I&O's Summary    28 Jul 2022 07:01  -  29 Jul 2022 07:00  --------------------------------------------------------  IN: 3100 mL / OUT: 3138 mL / NET: -38 mL        Physical Exam:  General: NAD  Abdomen: Soft, non-tender, non-distended, fundus firm  Incision: Pfannenstiel incision CDI, subcuticular suture closure  Pelvic: Lochia wnl    Labs:    Blood Type: B Positive  Antibody Screen: Negative  RPR: Negative               8.2    15.23 )-----------( 238      ( 07-29 @ 05:18 )             26.0                9.9    19.29 )-----------( 287      ( 07-28 @ 14:00 )             31.9                11.5   10.51 )-----------( 297      ( 07-28 @ 05:50 )             36.4         MEDICATIONS  (STANDING):  acetaminophen     Tablet .. 975 milliGRAM(s) Oral <User Schedule>  ascorbic acid 500 milliGRAM(s) Oral daily  diphtheria/tetanus/pertussis (acellular) Vaccine (ADAcel) 0.5 milliLiter(s) IntraMuscular once  ertapenem  IVPB      ertapenem  IVPB 1000 milliGRAM(s) IV Intermittent every 24 hours  ferrous    sulfate 325 milliGRAM(s) Oral daily  heparin   Injectable 5000 Unit(s) SubCutaneous every 12 hours  ibuprofen  Tablet. 600 milliGRAM(s) Oral every 6 hours  ketorolac   Injectable 30 milliGRAM(s) IV Push every 6 hours  labetalol 300 milliGRAM(s) Oral daily  labetalol 200 milliGRAM(s) Oral daily  lactated ringers. 1000 milliLiter(s) (125 mL/Hr) IV Continuous <Continuous>  lactated ringers. 1000 milliLiter(s) (75 mL/Hr) IV Continuous <Continuous>  oxytocin Infusion 333.333 milliUNIT(s)/Min (1000 mL/Hr) IV Continuous <Continuous>  prenatal multivitamin 1 Tablet(s) Oral daily    MEDICATIONS  (PRN):  diphenhydrAMINE 25 milliGRAM(s) Oral every 6 hours PRN Pruritus  lanolin Ointment 1 Application(s) Topical every 6 hours PRN Sore Nipples  magnesium hydroxide Suspension 30 milliLiter(s) Oral two times a day PRN Constipation  oxyCODONE    IR 5 milliGRAM(s) Oral every 3 hours PRN Moderate to Severe Pain (4-10)  oxyCODONE    IR 5 milliGRAM(s) Oral once PRN Moderate to Severe Pain (4-10)  simethicone 80 milliGRAM(s) Chew every 4 hours PRN Gas  
Pain Service Follow-up  Postop Day  1    S/P  C- Section    T(C): 36.8 (07-29-22 @ 13:53), Max: 37.3 (07-28-22 @ 22:00)  HR: 82 (07-29-22 @ 13:53) (64 - 90)  BP: 130/69 (07-29-22 @ 13:53) (101/54 - 139/82)  RR: 18 (07-29-22 @ 13:53) (16 - 20)  SpO2: 100% (07-29-22 @ 13:53) (98% - 100%)  Wt(kg): --      THERAPY:  Spinal Morphine     Sedation Score:	  [X] Alert	      [  ] Drowsy       [  ] Arousable	[  ] Asleep         [  ] Unresponsive    Side Effects:	  [X] None	      [  ] Nausea       [  ] Pruritus        [  ] Weakness   [  ] Numbness        ASSESSMENT/ PLAN   [ X ] Discontinue         [  ] Continue    [ X ] Documentation and Verification of current medications       Satisfactory Post Anesthetic Course    
Postpartum Note,  Section  She is a  37y woman who is now post-operative day: 1    Subjective:  The patient feels well.  She is ambulating.   She is tolerating regular diet.  She denies nausea and vomiting.  She is voiding.  Her pain is controlled.  She reports normal postpartum bleeding.    Vital Signs Last 24 Hrs  T(C): 36.8 (2022 05:32), Max: 37.3 (2022 22:00)  T(F): 98.3 (2022 05:32), Max: 99.1 (2022 22:00)  HR: 72 (2022 05:32) (64 - 83)  BP: 117/77 (2022 05:32) (101/54 - 146/89)  BP(mean): 92 (2022 15:00) (83 - 101)  RR: 18 (2022 05:32) (12 - 22)  SpO2: 99% (2022 05:32) (97% - 100%)    Parameters below as of 2022 05:32  Patient On (Oxygen Delivery Method): room air        Physical exam:  Gen: NAD  Breast: Soft, nontender, not engorged.  Abdomen: Soft, nontender, no distension , firm uterine fundus at umbilicus.  Incision: Clean, dry, and intact with steri strips  Pelvic: Normal lochia noted  Ext: No calf tenderness    LABS:                        8.2    15.23 )-----------( 238      ( 2022 05:18 )             26.0         Allergies    No Known Allergies    Intolerances      MEDICATIONS  (STANDING):  acetaminophen     Tablet .. 975 milliGRAM(s) Oral <User Schedule>  ascorbic acid 500 milliGRAM(s) Oral daily  diphtheria/tetanus/pertussis (acellular) Vaccine (ADAcel) 0.5 milliLiter(s) IntraMuscular once  ertapenem  IVPB      ertapenem  IVPB 1000 milliGRAM(s) IV Intermittent every 24 hours  ferrous    sulfate 325 milliGRAM(s) Oral daily  heparin   Injectable 5000 Unit(s) SubCutaneous every 12 hours  ibuprofen  Tablet. 600 milliGRAM(s) Oral every 6 hours  ketorolac   Injectable 30 milliGRAM(s) IV Push every 6 hours  labetalol 300 milliGRAM(s) Oral daily  labetalol 200 milliGRAM(s) Oral daily  lactated ringers. 1000 milliLiter(s) (125 mL/Hr) IV Continuous <Continuous>  lactated ringers. 1000 milliLiter(s) (75 mL/Hr) IV Continuous <Continuous>  oxytocin Infusion 333.333 milliUNIT(s)/Min (1000 mL/Hr) IV Continuous <Continuous>  prenatal multivitamin 1 Tablet(s) Oral daily    MEDICATIONS  (PRN):  diphenhydrAMINE 25 milliGRAM(s) Oral every 6 hours PRN Pruritus  lanolin Ointment 1 Application(s) Topical every 6 hours PRN Sore Nipples  magnesium hydroxide Suspension 30 milliLiter(s) Oral two times a day PRN Constipation  oxyCODONE    IR 5 milliGRAM(s) Oral every 3 hours PRN Moderate to Severe Pain (4-10)  oxyCODONE    IR 5 milliGRAM(s) Oral once PRN Moderate to Severe Pain (4-10)  simethicone 80 milliGRAM(s) Chew every 4 hours PRN Gas        Assessment and Plan  POD # 1 s/p  section  Doing well.  Encourage ambulation.

## 2022-07-31 ENCOUNTER — NON-APPOINTMENT (OUTPATIENT)
Age: 37
End: 2022-07-31

## 2022-07-31 DIAGNOSIS — I10 ESSENTIAL (PRIMARY) HYPERTENSION: ICD-10-CM

## 2022-07-31 RX ORDER — IBUPROFEN 600 MG/1
600 TABLET, FILM COATED ORAL EVERY 6 HOURS
Refills: 0 | Status: ACTIVE | COMMUNITY
Start: 2022-07-31

## 2022-07-31 RX ORDER — PRENATAL VIT NO.126/IRON/FOLIC 28MG-0.8MG
28-0.8 TABLET ORAL DAILY
Refills: 0 | Status: ACTIVE | COMMUNITY
Start: 2022-07-31

## 2022-07-31 RX ORDER — LABETALOL HYDROCHLORIDE 200 MG/1
200 TABLET, FILM COATED ORAL AT BEDTIME
Refills: 0 | Status: ACTIVE | COMMUNITY
Start: 2022-07-31

## 2022-07-31 RX ORDER — ACETAMINOPHEN 500 MG/1
500 TABLET ORAL
Refills: 0 | Status: ACTIVE | COMMUNITY
Start: 2022-07-31

## 2022-07-31 RX ORDER — LABETALOL HYDROCHLORIDE 300 MG/1
300 TABLET, FILM COATED ORAL EVERY MORNING
Refills: 0 | Status: ACTIVE | COMMUNITY
Start: 2022-07-31

## 2022-08-02 ENCOUNTER — NON-APPOINTMENT (OUTPATIENT)
Age: 37
End: 2022-08-02

## 2022-08-02 ENCOUNTER — INPATIENT (INPATIENT)
Facility: HOSPITAL | Age: 37
LOS: 1 days | Discharge: HOME HEALTH | End: 2022-08-04
Attending: OBSTETRICS & GYNECOLOGY | Admitting: OBSTETRICS & GYNECOLOGY

## 2022-08-02 VITALS — DIASTOLIC BLOOD PRESSURE: 86 MMHG | TEMPERATURE: 98 F | SYSTOLIC BLOOD PRESSURE: 129 MMHG

## 2022-08-02 DIAGNOSIS — O16.9 UNSPECIFIED MATERNAL HYPERTENSION, UNSPECIFIED TRIMESTER: ICD-10-CM

## 2022-08-02 LAB
ALBUMIN SERPL ELPH-MCNC: 3.3 G/DL — SIGNIFICANT CHANGE UP (ref 3.3–5)
ALP SERPL-CCNC: 104 U/L — SIGNIFICANT CHANGE UP (ref 40–120)
ALT FLD-CCNC: 50 U/L — HIGH (ref 4–33)
ANION GAP SERPL CALC-SCNC: 10 MMOL/L — SIGNIFICANT CHANGE UP (ref 7–14)
APTT BLD: 29.7 SEC — SIGNIFICANT CHANGE UP (ref 27–36.3)
AST SERPL-CCNC: 39 U/L — HIGH (ref 4–32)
BASOPHILS # BLD AUTO: 0.02 K/UL — SIGNIFICANT CHANGE UP (ref 0–0.2)
BASOPHILS NFR BLD AUTO: 0.2 % — SIGNIFICANT CHANGE UP (ref 0–2)
BILIRUB SERPL-MCNC: <0.2 MG/DL — SIGNIFICANT CHANGE UP (ref 0.2–1.2)
BLD GP AB SCN SERPL QL: NEGATIVE — SIGNIFICANT CHANGE UP
BUN SERPL-MCNC: 12 MG/DL — SIGNIFICANT CHANGE UP (ref 7–23)
CALCIUM SERPL-MCNC: 9.7 MG/DL — SIGNIFICANT CHANGE UP (ref 8.4–10.5)
CHLORIDE SERPL-SCNC: 105 MMOL/L — SIGNIFICANT CHANGE UP (ref 98–107)
CO2 SERPL-SCNC: 26 MMOL/L — SIGNIFICANT CHANGE UP (ref 22–31)
CREAT SERPL-MCNC: 0.64 MG/DL — SIGNIFICANT CHANGE UP (ref 0.5–1.3)
EGFR: 117 ML/MIN/1.73M2 — SIGNIFICANT CHANGE UP
EOSINOPHIL # BLD AUTO: 0.17 K/UL — SIGNIFICANT CHANGE UP (ref 0–0.5)
EOSINOPHIL NFR BLD AUTO: 1.8 % — SIGNIFICANT CHANGE UP (ref 0–6)
FIBRINOGEN PPP-MCNC: 587 MG/DL — HIGH (ref 330–520)
GLUCOSE SERPL-MCNC: 88 MG/DL — SIGNIFICANT CHANGE UP (ref 70–99)
HCT VFR BLD CALC: 26.7 % — LOW (ref 34.5–45)
HGB BLD-MCNC: 8.2 G/DL — LOW (ref 11.5–15.5)
IANC: 5.4 K/UL — SIGNIFICANT CHANGE UP (ref 1.8–7.4)
IMM GRANULOCYTES NFR BLD AUTO: 1.4 % — SIGNIFICANT CHANGE UP (ref 0–1.5)
INR BLD: 0.96 RATIO — SIGNIFICANT CHANGE UP (ref 0.88–1.16)
LDH SERPL L TO P-CCNC: 252 U/L — HIGH (ref 135–225)
LYMPHOCYTES # BLD AUTO: 3.01 K/UL — SIGNIFICANT CHANGE UP (ref 1–3.3)
LYMPHOCYTES # BLD AUTO: 32.2 % — SIGNIFICANT CHANGE UP (ref 13–44)
MCHC RBC-ENTMCNC: 26.7 PG — LOW (ref 27–34)
MCHC RBC-ENTMCNC: 30.7 GM/DL — LOW (ref 32–36)
MCV RBC AUTO: 87 FL — SIGNIFICANT CHANGE UP (ref 80–100)
MONOCYTES # BLD AUTO: 0.62 K/UL — SIGNIFICANT CHANGE UP (ref 0–0.9)
MONOCYTES NFR BLD AUTO: 6.6 % — SIGNIFICANT CHANGE UP (ref 2–14)
NEUTROPHILS # BLD AUTO: 5.4 K/UL — SIGNIFICANT CHANGE UP (ref 1.8–7.4)
NEUTROPHILS NFR BLD AUTO: 57.8 % — SIGNIFICANT CHANGE UP (ref 43–77)
NRBC # BLD: 0 /100 WBCS — SIGNIFICANT CHANGE UP
NRBC # FLD: 0 K/UL — SIGNIFICANT CHANGE UP
PLATELET # BLD AUTO: 378 K/UL — SIGNIFICANT CHANGE UP (ref 150–400)
POTASSIUM SERPL-MCNC: 4.3 MMOL/L — SIGNIFICANT CHANGE UP (ref 3.5–5.3)
POTASSIUM SERPL-SCNC: 4.3 MMOL/L — SIGNIFICANT CHANGE UP (ref 3.5–5.3)
PROT SERPL-MCNC: 6.7 G/DL — SIGNIFICANT CHANGE UP (ref 6–8.3)
PROTHROM AB SERPL-ACNC: 11.1 SEC — SIGNIFICANT CHANGE UP (ref 10.5–13.4)
RBC # BLD: 3.07 M/UL — LOW (ref 3.8–5.2)
RBC # FLD: 13.9 % — SIGNIFICANT CHANGE UP (ref 10.3–14.5)
RH IG SCN BLD-IMP: POSITIVE — SIGNIFICANT CHANGE UP
SODIUM SERPL-SCNC: 141 MMOL/L — SIGNIFICANT CHANGE UP (ref 135–145)
URATE SERPL-MCNC: 5.1 MG/DL — SIGNIFICANT CHANGE UP (ref 2.5–7)
WBC # BLD: 9.35 K/UL — SIGNIFICANT CHANGE UP (ref 3.8–10.5)
WBC # FLD AUTO: 9.35 K/UL — SIGNIFICANT CHANGE UP (ref 3.8–10.5)

## 2022-08-02 RX ORDER — MAGNESIUM SULFATE 500 MG/ML
4 VIAL (ML) INJECTION ONCE
Refills: 0 | Status: COMPLETED | OUTPATIENT
Start: 2022-08-02 | End: 2022-08-02

## 2022-08-02 RX ORDER — NIFEDIPINE 30 MG
10 TABLET, EXTENDED RELEASE 24 HR ORAL ONCE
Refills: 0 | Status: COMPLETED | OUTPATIENT
Start: 2022-08-02 | End: 2022-08-02

## 2022-08-02 RX ORDER — MAGNESIUM SULFATE 500 MG/ML
4 VIAL (ML) INJECTION ONCE
Refills: 0 | Status: DISCONTINUED | OUTPATIENT
Start: 2022-08-02 | End: 2022-08-02

## 2022-08-02 RX ORDER — MAGNESIUM SULFATE 500 MG/ML
2 VIAL (ML) INJECTION
Qty: 40 | Refills: 0 | Status: DISCONTINUED | OUTPATIENT
Start: 2022-08-02 | End: 2022-08-02

## 2022-08-02 RX ORDER — HEPARIN SODIUM 5000 [USP'U]/ML
5000 INJECTION INTRAVENOUS; SUBCUTANEOUS EVERY 12 HOURS
Refills: 0 | Status: DISCONTINUED | OUTPATIENT
Start: 2022-08-02 | End: 2022-08-04

## 2022-08-02 RX ORDER — MAGNESIUM SULFATE 500 MG/ML
4 VIAL (ML) INJECTION ONCE
Refills: 0 | Status: DISCONTINUED | OUTPATIENT
Start: 2022-08-02 | End: 2022-08-03

## 2022-08-02 RX ORDER — LABETALOL HCL 100 MG
300 TABLET ORAL
Refills: 0 | Status: DISCONTINUED | OUTPATIENT
Start: 2022-08-02 | End: 2022-08-04

## 2022-08-02 RX ORDER — MAGNESIUM SULFATE 500 MG/ML
2 VIAL (ML) INJECTION
Qty: 40 | Refills: 0 | Status: DISCONTINUED | OUTPATIENT
Start: 2022-08-02 | End: 2022-08-03

## 2022-08-02 RX ADMIN — Medication 10 MILLIGRAM(S): at 21:30

## 2022-08-02 RX ADMIN — Medication 10 MILLIGRAM(S): at 22:55

## 2022-08-02 RX ADMIN — Medication 300 GRAM(S): at 22:13

## 2022-08-02 NOTE — H&P ADULT - NSHPPHYSICALEXAM_GEN_ALL_CORE
Vital Signs Last 24 Hrs  T(C): --  T(F): --  HR: --  BP: --  BP(mean): --  RR: --  SpO2: --    Lungs: Anterior and posterior lung sounds clear bilaterally  Cardiac: R/R/R  Abdomen: soft, non tender. no guarding or rebound tenderness  DTR2+

## 2022-08-02 NOTE — H&P ADULT - HISTORY OF PRESENT ILLNESS
36 y/o pt pp day #5 s/p repeat caesarian on 22 with known gestational hypertension on Labetalol 300mg BID sent from the office for elevated blood pressures of 160s/100s. pt was never given magnesium sulfate.  pt reports a mild frontal headache, 1/10 pain,  that started this morning. pt denies taking any Tylenol for relief.  Pt reports that she did not take her nightly dose of labetalol and states that "I was not aware I needed to take the medication at the same time everyday". pt denies any visual disturbances or epigastric pain. pt denies n/v/d, fever or chills.  Pt is breast/bottle feeding     NKDA  PMH: denies  PSH:   x2  d&c x1  OB:  [facility]  breech presentation  [facility]  22 FT tn 2950gm   SAB x1 incomplete d&c x1  GYN:  denies fibroids/cysts/stds  Social

## 2022-08-02 NOTE — H&P ADULT - ASSESSMENT
5  BP: 171/95  repeat 170/101  Plan:  -Procardia 10mg IR STAT  -Plan for admission     38 y/o pt pp day #5 s/p repeat  on 22 presents with preeclampsia with severe features  d/w Dr. Quijano and Dr. Albrecht  Plan:  -Admit l&d. Routine labs  -Magnesium sulfate for neuro toxicity   -Continue Labetalol 300mg BID  -PEC labs pending  -Breast pump at bedside   -Covid 19 pending for patient   -Consent signed and witnessed at bedside  5  BP: 171/95  repeat 170/101  Plan:  -Procardia 10mg IR STAT  -Plan for admission     38 y/o pt pp day #5 s/p repeat  on 22 presents with preeclampsia with severe features  d/w Dr. Quijano and Dr. Albrecht  Plan:  -Admit l&d. Routine labs  -Magnesium sulfate for neuro toxicity   -Continue Labetalol 300mg BID  -PEC labs pending  -Breast pump at bedside   -Covid 19 pending for patient   -Consent signed and witnessed at bedside     6120  Blood pressure: 182/97  -10mg Procardia IR

## 2022-08-02 NOTE — H&P ADULT - PROBLEM SELECTOR PLAN 1
-Admit l&d. Routine labs  -Magnesium sulfate for neuro toxicity   -Continue Labetalol 300mg BID  -PEC labs pending  -Breast pump at bedside   -Covid 19 pending for patient   -Consent signed and witnessed at bedside

## 2022-08-03 ENCOUNTER — TRANSCRIPTION ENCOUNTER (OUTPATIENT)
Age: 37
End: 2022-08-03

## 2022-08-03 ENCOUNTER — NON-APPOINTMENT (OUTPATIENT)
Age: 37
End: 2022-08-03

## 2022-08-03 PROBLEM — O13.9 GESTATIONAL [PREGNANCY-INDUCED] HYPERTENSION WITHOUT SIGNIFICANT PROTEINURIA, UNSPECIFIED TRIMESTER: Chronic | Status: ACTIVE | Noted: 2022-07-14

## 2022-08-03 LAB
ALBUMIN SERPL ELPH-MCNC: 3.6 G/DL — SIGNIFICANT CHANGE UP (ref 3.3–5)
ALP SERPL-CCNC: 115 U/L — SIGNIFICANT CHANGE UP (ref 40–120)
ALT FLD-CCNC: 53 U/L — HIGH (ref 4–33)
ANION GAP SERPL CALC-SCNC: 14 MMOL/L — SIGNIFICANT CHANGE UP (ref 7–14)
AST SERPL-CCNC: 39 U/L — HIGH (ref 4–32)
BASOPHILS # BLD AUTO: 0.02 K/UL — SIGNIFICANT CHANGE UP (ref 0–0.2)
BASOPHILS NFR BLD AUTO: 0.2 % — SIGNIFICANT CHANGE UP (ref 0–2)
BILIRUB SERPL-MCNC: 0.3 MG/DL — SIGNIFICANT CHANGE UP (ref 0.2–1.2)
BUN SERPL-MCNC: 8 MG/DL — SIGNIFICANT CHANGE UP (ref 7–23)
CALCIUM SERPL-MCNC: 8.3 MG/DL — LOW (ref 8.4–10.5)
CHLORIDE SERPL-SCNC: 99 MMOL/L — SIGNIFICANT CHANGE UP (ref 98–107)
CO2 SERPL-SCNC: 24 MMOL/L — SIGNIFICANT CHANGE UP (ref 22–31)
COVID-19 SPIKE DOMAIN AB INTERP: POSITIVE
COVID-19 SPIKE DOMAIN ANTIBODY RESULT: >250 U/ML — HIGH
CREAT SERPL-MCNC: 0.55 MG/DL — SIGNIFICANT CHANGE UP (ref 0.5–1.3)
EGFR: 121 ML/MIN/1.73M2 — SIGNIFICANT CHANGE UP
EOSINOPHIL # BLD AUTO: 0.19 K/UL — SIGNIFICANT CHANGE UP (ref 0–0.5)
EOSINOPHIL NFR BLD AUTO: 2.2 % — SIGNIFICANT CHANGE UP (ref 0–6)
FIBRINOGEN PPP-MCNC: 511 MG/DL — SIGNIFICANT CHANGE UP (ref 330–520)
GLUCOSE SERPL-MCNC: 90 MG/DL — SIGNIFICANT CHANGE UP (ref 70–99)
HCT VFR BLD CALC: 32.1 % — LOW (ref 34.5–45)
HGB BLD-MCNC: 9.6 G/DL — LOW (ref 11.5–15.5)
IANC: 5.3 K/UL — SIGNIFICANT CHANGE UP (ref 1.8–7.4)
IMM GRANULOCYTES NFR BLD AUTO: 1.6 % — HIGH (ref 0–1.5)
LDH SERPL L TO P-CCNC: 312 U/L — HIGH (ref 135–225)
LYMPHOCYTES # BLD AUTO: 2.51 K/UL — SIGNIFICANT CHANGE UP (ref 1–3.3)
LYMPHOCYTES # BLD AUTO: 28.8 % — SIGNIFICANT CHANGE UP (ref 13–44)
MAGNESIUM SERPL-MCNC: 6.3 MG/DL — HIGH (ref 1.6–2.6)
MAGNESIUM SERPL-MCNC: 6.7 MG/DL — HIGH (ref 1.6–2.6)
MCHC RBC-ENTMCNC: 26 PG — LOW (ref 27–34)
MCHC RBC-ENTMCNC: 29.9 GM/DL — LOW (ref 32–36)
MCV RBC AUTO: 87 FL — SIGNIFICANT CHANGE UP (ref 80–100)
MONOCYTES # BLD AUTO: 0.56 K/UL — SIGNIFICANT CHANGE UP (ref 0–0.9)
MONOCYTES NFR BLD AUTO: 6.4 % — SIGNIFICANT CHANGE UP (ref 2–14)
NEUTROPHILS # BLD AUTO: 5.3 K/UL — SIGNIFICANT CHANGE UP (ref 1.8–7.4)
NEUTROPHILS NFR BLD AUTO: 60.8 % — SIGNIFICANT CHANGE UP (ref 43–77)
NRBC # BLD: 0 /100 WBCS — SIGNIFICANT CHANGE UP
NRBC # FLD: 0 K/UL — SIGNIFICANT CHANGE UP
PLATELET # BLD AUTO: 386 K/UL — SIGNIFICANT CHANGE UP (ref 150–400)
POTASSIUM SERPL-MCNC: 4 MMOL/L — SIGNIFICANT CHANGE UP (ref 3.5–5.3)
POTASSIUM SERPL-SCNC: 4 MMOL/L — SIGNIFICANT CHANGE UP (ref 3.5–5.3)
PROT SERPL-MCNC: 7.5 G/DL — SIGNIFICANT CHANGE UP (ref 6–8.3)
RBC # BLD: 3.69 M/UL — LOW (ref 3.8–5.2)
RBC # FLD: 14 % — SIGNIFICANT CHANGE UP (ref 10.3–14.5)
SARS-COV-2 IGG+IGM SERPL QL IA: >250 U/ML — HIGH
SARS-COV-2 IGG+IGM SERPL QL IA: POSITIVE
SARS-COV-2 RNA SPEC QL NAA+PROBE: SIGNIFICANT CHANGE UP
SODIUM SERPL-SCNC: 137 MMOL/L — SIGNIFICANT CHANGE UP (ref 135–145)
URATE SERPL-MCNC: 5.4 MG/DL — SIGNIFICANT CHANGE UP (ref 2.5–7)
WBC # BLD: 8.72 K/UL — SIGNIFICANT CHANGE UP (ref 3.8–10.5)
WBC # FLD AUTO: 8.72 K/UL — SIGNIFICANT CHANGE UP (ref 3.8–10.5)

## 2022-08-03 RX ORDER — SODIUM CHLORIDE 9 MG/ML
1000 INJECTION, SOLUTION INTRAVENOUS
Refills: 0 | Status: DISCONTINUED | OUTPATIENT
Start: 2022-08-03 | End: 2022-08-04

## 2022-08-03 RX ADMIN — Medication 50 GM/HR: at 01:28

## 2022-08-03 RX ADMIN — Medication 50 GM/HR: at 19:22

## 2022-08-03 RX ADMIN — Medication 300 MILLIGRAM(S): at 00:11

## 2022-08-03 RX ADMIN — Medication 300 MILLIGRAM(S): at 12:23

## 2022-08-03 RX ADMIN — Medication 50 GM/HR: at 07:20

## 2022-08-03 RX ADMIN — HEPARIN SODIUM 5000 UNIT(S): 5000 INJECTION INTRAVENOUS; SUBCUTANEOUS at 18:22

## 2022-08-03 RX ADMIN — HEPARIN SODIUM 5000 UNIT(S): 5000 INJECTION INTRAVENOUS; SUBCUTANEOUS at 06:14

## 2022-08-03 NOTE — DISCHARGE NOTE PROVIDER - REASON FOR ADMISSION
Preventive Health Recommendations  Male Ages 26 - 39    Yearly exam:             See your health care provider every year in order to  o   Review health changes.   o   Discuss preventive care.    o   Review your medicines if your doctor has prescribed any.    You should be tested each year for STDs (sexually transmitted diseases), if you re at risk.     After age 35, talk to your provider about cholesterol testing. If you are at risk for heart disease, have your cholesterol tested at least every 5 years.     If you are at risk for diabetes, you should have a diabetes test (fasting glucose).  Shots: Get a flu shot each year. Get a tetanus shot every 10 years.     Nutrition:    Eat at least 5 servings of fruits and vegetables daily.     Eat whole-grain bread, whole-wheat pasta and brown rice instead of white grains and rice.     Get adequate Calcium and Vitamin D.     Lifestyle    Exercise for at least 150 minutes a week (30 minutes a day, 5 days a week). This will help you control your weight and prevent disease.     Limit alcohol to one drink per day.     No smoking.     Wear sunscreen to prevent skin cancer.     See your dentist every six months for an exam and cleaning.     
pre-eclampsia with severe features

## 2022-08-03 NOTE — DISCHARGE NOTE PROVIDER - NSDCMRMEDTOKEN_GEN_ALL_CORE_FT
labetalol 300 mg oral tablet: 1 tab(s) orally once a day in the AM   ascorbic acid 500 mg oral tablet: 1 tab(s) orally once a day  ferrous sulfate 325 mg (65 mg elemental iron) oral tablet: 1 tab(s) orally 3 times a day  ibuprofen 600 mg oral tablet: 1 tab(s) orally every 6 hours, As needed, Moderate Pain (4 - 6), Severe Pain (7 - 10)  labetalol 300 mg oral tablet: 1 tab(s) orally 2 times a day MDD:2    Hold if BP &lt;110/60 or if HR &lt;60  Prenatal Multivitamins with Folic Acid 1 mg oral tablet: 1 tab(s) orally once a day

## 2022-08-03 NOTE — DISCHARGE NOTE PROVIDER - NSDCCPCAREPLAN_GEN_ALL_CORE_FT
PRINCIPAL DISCHARGE DIAGNOSIS  Diagnosis: Preeclampsia in postpartum period  Assessment and Plan of Treatment: - Continue BP meds as prescribed (hold is BP is under 110/60 or HR is under 60)  -Take blood pressure with at home cuff prior to taking medications; if BP is >150/90 call MD  - Return to hospital with headaches, visual changes, abdominal pain, nausea, vomiting, chest pain or shortness of breathe  - Follow up with OB in 2 days for BP check  - Follow up with [facility] (email sent for follow up; call [phone])      SECONDARY DISCHARGE DIAGNOSES  Diagnosis:  section wound seroma, postpartum  Assessment and Plan of Treatment: Left abdominal pannus/ site of previous c section - Cleanse with NS, pat dry. Apply Liquid barrier film to periwound skin (allow to dry). Cut Aquacel rope dressing vertically along stitching, pack wound depth, leaving 2 inches out to wick. Cover with small silicone foam with border. Change daily or PRN if soiled.   VNS for wound care

## 2022-08-03 NOTE — PATIENT PROFILE ADULT - DOMESTIC TRAVEL HIGH RISK QUESTION
Patient's paperwork was filled out and signed. It was faxed to [facility] at [phone].    Forms have been sent to medical records to be scanned.   
No

## 2022-08-03 NOTE — DISCHARGE NOTE PROVIDER - NSDCFUADDINST_GEN_ALL_CORE_FT
Left abdominal pannus/ site of previous c section - Cleanse with NS, pat dry. Apply Liquid barrier film to periwound skin (allow to dry). Cut Aquacel rope dressing vertically along stitching, pack wound depth, leaving 2 inches out to wick. Cover with small silicone foam with border. Change daily or PRN if soiled.

## 2022-08-03 NOTE — DISCHARGE NOTE PROVIDER - CARE PROVIDER_API CALL
Abigail Perkins)  Obstetrics and Gynecology  [address], [address]  [place] [address]  Phone: [phone]  Fax: [phone]  Follow Up Time:

## 2022-08-03 NOTE — DISCHARGE NOTE PROVIDER - HOSPITAL COURSE
36yo s/p rLTCS patient was readmitted with si PEC in the postpartum period and seroma on left side of incision. HELLP labs notable for mildly elevated LFTs 39/50 On admission patient was given procardia 10IR. MgSo4 was started and given for 24 hours. BP is well controlled on Labetalol 300mg BID. Seroma Opened in the office and packed on 8/2; Wound Care consulted and recommends Left abdominal pannus/ site of previous c section - Cleanse with NS, pat dry. Apply Liquid barrier film to periwound skin (allow to dry). Cut Aquacel rope dressing vertically along stitching, pack wound depth, leaving 2 inches out to wick. Cover with small silicone foam with border. Change daily or PRN if soiled. CT A/P with IV and oral contrast ordered 8/3 _________   Pt stable for discharge home with home BP monitoring, VNS for wound care and follow up with OB and cardio OB. 38yo s/p rLTCS patient was readmitted with si PEC in the postpartum period and seroma on left side of incision. HELLP labs notable for mildly elevated LFTs 39/50 On admission patient was given procardia 10IR. MgSo4 was started and given for 24 hours. BP is well controlled on Labetalol 300mg BID. Seroma Opened in the office and packed on 8/2; Wound Care consulted and recommends Left abdominal pannus/ site of previous c section - Cleanse with NS, pat dry. Apply Liquid barrier film to periwound skin (allow to dry). Cut Aquacel rope dressing vertically along stitching, pack wound depth, leaving 2 inches out to wick. Cover with small silicone foam with border. Change daily or PRN if soiled. CT A/P with IV and oral contrast ordered 8/3 and performed on 8/4. No evidence of collection seen.      Pt stable for discharge home with home BP monitoring, VNS for wound care and follow up with OB and cardio OB.

## 2022-08-03 NOTE — PROGRESS NOTE ADULT - SUBJECTIVE AND OBJECTIVE BOX
OB Progress Note:  Delivery, POD#5    S: 36yo POD#5 s/p rLTCS, readmitted with severe preclampsia . Her pain is well controlled. She is tolerating a regular diet and passing flatus. Denies N/V. Denies CP/SOB/lightheadedness/dizziness. Endorses light vaginal bleeding, less than one pad per hour. She is ambulating without difficulty. Voiding spontaneously.     O:   Vital Signs Last 24 Hrs  T(C): 36.7 (03 Aug 2022 05:30), Max: 36.8 (02 Aug 2022 23:00)  T(F): 98 (03 Aug 2022 05:30), Max: 98.2 (02 Aug 2022 23:00)  HR: 76 (03 Aug 2022 05:30) (69 - 81)  BP: 114/68 (03 Aug 2022 05:30) (109/70 - 133/86)  BP(mean): 81 (03 Aug 2022 01:15) (79 - 94)  RR: 17 (03 Aug 2022 05:30) (17 - 17)  SpO2: 100% (03 Aug 2022 05:30) (100% - 100%)    Parameters below as of 03 Aug 2022 05:30  Patient On (Oxygen Delivery Method): room air        Labs:  Blood type: B Positive  Rubella IgG: RPR: Negative                          9.6<L>   8.72 >-----------< 386    (  @ 05:50 )             32.1<L>                        8.2<L>   9.35 >-----------< 378    (  @ 21:38 )             26.7<L>    22 @ 05:50      137  |  99  |  8   ----------------------------<  90  4.0   |  24  |  0.55    22 @ 21:38      141  |  105  |  12  ----------------------------<  88  4.3   |  26  |  0.64        Ca    8.3<L>      03 Aug 2022 05:50  Ca    9.7      02 Aug 2022 21:38    TPro  7.5  /  Alb  3.6  /  TBili  0.3  /  DBili  x   /  AST  39<H>  /  ALT  53<H>  /  AlkPhos  115  22 @ 05:50  TPro  6.7  /  Alb  3.3  /  TBili  <0.2  /  DBili  x   /  AST  39<H>  /  ALT  50<H>  /  AlkPhos  104  22 @ 21:38          PE:  General: NAD  Heart: extremities well-perfused  Lungs: breathing comfortably  Abdomen: Mildly distended, appropriately tender, firm fundus, incision c/d/i  Extremities: No erythema, no pitting edema     OB Progress Note:  Delivery, POD#5    S: 38yo POD#5 s/p rLTCS, readmitted with severe preclampsia . Her pain is well controlled. She is tolerating a regular diet and passing flatus. Denies N/V. Denies CP/SOB/lightheadedness/dizziness. Endorses light vaginal bleeding, less than one pad per hour. She is ambulating without difficulty. Voiding spontaneously.     O:   Vital Signs Last 24 Hrs  T(C): 36.7 (03 Aug 2022 05:30), Max: 36.8 (02 Aug 2022 23:00)  T(F): 98 (03 Aug 2022 05:30), Max: 98.2 (02 Aug 2022 23:00)  HR: 76 (03 Aug 2022 05:30) (69 - 81)  BP: 114/68 (03 Aug 2022 05:30) (109/70 - 133/86)  BP(mean): 81 (03 Aug 2022 01:15) (79 - 94)  RR: 17 (03 Aug 2022 05:30) (17 - 17)  SpO2: 100% (03 Aug 2022 05:30) (100% - 100%)    Parameters below as of 03 Aug 2022 05:30  Patient On (Oxygen Delivery Method): room air        Labs:  Blood type: B Positive  Rubella IgG: RPR: Negative                          9.6<L>   8.72 >-----------< 386    (  @ 05:50 )             32.1<L>                        8.2<L>   9.35 >-----------< 378    (  @ 21:38 )             26.7<L>    22 @ 05:50      137  |  99  |  8   ----------------------------<  90  4.0   |  24  |  0.55    22 @ 21:38      141  |  105  |  12  ----------------------------<  88  4.3   |  26  |  0.64        Ca    8.3<L>      03 Aug 2022 05:50  Ca    9.7      02 Aug 2022 21:38    TPro  7.5  /  Alb  3.6  /  TBili  0.3  /  DBili  x   /  AST  39<H>  /  ALT  53<H>  /  AlkPhos  115  22 @ 05:50  TPro  6.7  /  Alb  3.3  /  TBili  <0.2  /  DBili  x   /  AST  39<H>  /  ALT  50<H>  /  AlkPhos  104  22 @ 21:38          PE:  General: NAD  Heart: extremities well-perfused  Lungs: breathing comfortably  Abdomen: Mildly distended, appropriately tender, firm fundus, incision - approximately 1cm opening on left aspect of her incision with packing in place, erythema noted around left aspect of incision covered in bandage  Extremities: No erythema, no pitting edema

## 2022-08-03 NOTE — DISCHARGE NOTE PROVIDER - NSDCFUADDAPPT_GEN_ALL_CORE_FT
- Continue BP meds as prescribed (hold is BP is under 110/60 or HR is under 60)  -Take blood pressure with at home cuff prior to taking medications; if BP is >150/90 call MD  - Return to hospital with headaches, visual changes, abdominal pain, nausea, vomiting, chest pain or shortness of breathe  - Follow up with OB in 2 days for BP check  - Follow up with [facility] (email sent for follow up; call [phone])

## 2022-08-03 NOTE — PATIENT PROFILE ADULT - PHONE #
Pounds Preamble Statement (Weight Entered In Details Tab): Reported Weight in pounds:   [phone]

## 2022-08-03 NOTE — PATIENT PROFILE ADULT - FALL HARM RISK - UNIVERSAL INTERVENTIONS
Bed in lowest position, wheels locked, appropriate side rails in place/Call bell, personal items and telephone in reach/Instruct patient to call for assistance before getting out of bed or chair/Non-slip footwear when patient is out of bed/[place] to call system/Physically safe environment - no spills, clutter or unnecessary equipment/Purposeful Proactive Rounding/Room/bathroom lighting operational, light cord in reach

## 2022-08-04 ENCOUNTER — TRANSCRIPTION ENCOUNTER (OUTPATIENT)
Age: 37
End: 2022-08-04

## 2022-08-04 VITALS
RESPIRATION RATE: 17 BRPM | HEART RATE: 81 BPM | OXYGEN SATURATION: 100 % | DIASTOLIC BLOOD PRESSURE: 88 MMHG | SYSTOLIC BLOOD PRESSURE: 130 MMHG | TEMPERATURE: 98 F

## 2022-08-04 LAB
ALBUMIN SERPL ELPH-MCNC: 3.5 G/DL — SIGNIFICANT CHANGE UP (ref 3.3–5)
ALP SERPL-CCNC: 100 U/L — SIGNIFICANT CHANGE UP (ref 40–120)
ALT FLD-CCNC: 38 U/L — HIGH (ref 4–33)
ANION GAP SERPL CALC-SCNC: 11 MMOL/L — SIGNIFICANT CHANGE UP (ref 7–14)
APTT BLD: 28.1 SEC — SIGNIFICANT CHANGE UP (ref 27–36.3)
AST SERPL-CCNC: 24 U/L — SIGNIFICANT CHANGE UP (ref 4–32)
BASOPHILS # BLD AUTO: 0.04 K/UL — SIGNIFICANT CHANGE UP (ref 0–0.2)
BASOPHILS NFR BLD AUTO: 0.4 % — SIGNIFICANT CHANGE UP (ref 0–2)
BILIRUB SERPL-MCNC: 0.2 MG/DL — SIGNIFICANT CHANGE UP (ref 0.2–1.2)
BUN SERPL-MCNC: 14 MG/DL — SIGNIFICANT CHANGE UP (ref 7–23)
CALCIUM SERPL-MCNC: 7.5 MG/DL — LOW (ref 8.4–10.5)
CHLORIDE SERPL-SCNC: 101 MMOL/L — SIGNIFICANT CHANGE UP (ref 98–107)
CO2 SERPL-SCNC: 26 MMOL/L — SIGNIFICANT CHANGE UP (ref 22–31)
CREAT SERPL-MCNC: 0.75 MG/DL — SIGNIFICANT CHANGE UP (ref 0.5–1.3)
EGFR: 105 ML/MIN/1.73M2 — SIGNIFICANT CHANGE UP
EOSINOPHIL # BLD AUTO: 0.18 K/UL — SIGNIFICANT CHANGE UP (ref 0–0.5)
EOSINOPHIL NFR BLD AUTO: 2 % — SIGNIFICANT CHANGE UP (ref 0–6)
FIBRINOGEN PPP-MCNC: 528 MG/DL — HIGH (ref 330–520)
GLUCOSE SERPL-MCNC: 87 MG/DL — SIGNIFICANT CHANGE UP (ref 70–99)
HCT VFR BLD CALC: 28.8 % — LOW (ref 34.5–45)
HGB BLD-MCNC: 8.9 G/DL — LOW (ref 11.5–15.5)
IANC: 5.12 K/UL — SIGNIFICANT CHANGE UP (ref 1.8–7.4)
IMM GRANULOCYTES NFR BLD AUTO: 1.3 % — SIGNIFICANT CHANGE UP (ref 0–1.5)
INR BLD: 0.92 RATIO — SIGNIFICANT CHANGE UP (ref 0.88–1.16)
LDH SERPL L TO P-CCNC: 251 U/L — HIGH (ref 135–225)
LYMPHOCYTES # BLD AUTO: 2.95 K/UL — SIGNIFICANT CHANGE UP (ref 1–3.3)
LYMPHOCYTES # BLD AUTO: 32.9 % — SIGNIFICANT CHANGE UP (ref 13–44)
MCHC RBC-ENTMCNC: 26.3 PG — LOW (ref 27–34)
MCHC RBC-ENTMCNC: 30.9 GM/DL — LOW (ref 32–36)
MCV RBC AUTO: 85.2 FL — SIGNIFICANT CHANGE UP (ref 80–100)
MONOCYTES # BLD AUTO: 0.57 K/UL — SIGNIFICANT CHANGE UP (ref 0–0.9)
MONOCYTES NFR BLD AUTO: 6.3 % — SIGNIFICANT CHANGE UP (ref 2–14)
NEUTROPHILS # BLD AUTO: 5.12 K/UL — SIGNIFICANT CHANGE UP (ref 1.8–7.4)
NEUTROPHILS NFR BLD AUTO: 57.1 % — SIGNIFICANT CHANGE UP (ref 43–77)
NRBC # BLD: 0 /100 WBCS — SIGNIFICANT CHANGE UP
NRBC # FLD: 0 K/UL — SIGNIFICANT CHANGE UP
PLATELET # BLD AUTO: 448 K/UL — HIGH (ref 150–400)
POTASSIUM SERPL-MCNC: 4.5 MMOL/L — SIGNIFICANT CHANGE UP (ref 3.5–5.3)
POTASSIUM SERPL-SCNC: 4.5 MMOL/L — SIGNIFICANT CHANGE UP (ref 3.5–5.3)
PROT SERPL-MCNC: 6.8 G/DL — SIGNIFICANT CHANGE UP (ref 6–8.3)
PROTHROM AB SERPL-ACNC: 10.7 SEC — SIGNIFICANT CHANGE UP (ref 10.5–13.4)
RBC # BLD: 3.38 M/UL — LOW (ref 3.8–5.2)
RBC # FLD: 14.3 % — SIGNIFICANT CHANGE UP (ref 10.3–14.5)
SODIUM SERPL-SCNC: 138 MMOL/L — SIGNIFICANT CHANGE UP (ref 135–145)
URATE SERPL-MCNC: 5.9 MG/DL — SIGNIFICANT CHANGE UP (ref 2.5–7)
WBC # BLD: 8.98 K/UL — SIGNIFICANT CHANGE UP (ref 3.8–10.5)
WBC # FLD AUTO: 8.98 K/UL — SIGNIFICANT CHANGE UP (ref 3.8–10.5)

## 2022-08-04 PROCEDURE — 74177 CT ABD & PELVIS W/CONTRAST: CPT | Mod: 26

## 2022-08-04 RX ORDER — IBUPROFEN 200 MG
1 TABLET ORAL
Qty: 0 | Refills: 0 | DISCHARGE
Start: 2022-08-04

## 2022-08-04 RX ORDER — ASCORBIC ACID 60 MG
1 TABLET,CHEWABLE ORAL
Qty: 0 | Refills: 0 | DISCHARGE
Start: 2022-08-04

## 2022-08-04 RX ORDER — FERROUS SULFATE 325(65) MG
1 TABLET ORAL
Qty: 0 | Refills: 0 | DISCHARGE
Start: 2022-08-04

## 2022-08-04 RX ORDER — ASCORBIC ACID 60 MG
500 TABLET,CHEWABLE ORAL DAILY
Refills: 0 | Status: DISCONTINUED | OUTPATIENT
Start: 2022-08-04 | End: 2022-08-04

## 2022-08-04 RX ORDER — LABETALOL HCL 100 MG
1 TABLET ORAL
Qty: 60 | Refills: 0
Start: 2022-08-04 | End: 2022-09-02

## 2022-08-04 RX ORDER — IBUPROFEN 200 MG
600 TABLET ORAL EVERY 6 HOURS
Refills: 0 | Status: DISCONTINUED | OUTPATIENT
Start: 2022-08-04 | End: 2022-08-04

## 2022-08-04 RX ORDER — FERROUS SULFATE 325(65) MG
325 TABLET ORAL THREE TIMES A DAY
Refills: 0 | Status: DISCONTINUED | OUTPATIENT
Start: 2022-08-04 | End: 2022-08-04

## 2022-08-04 RX ADMIN — HEPARIN SODIUM 5000 UNIT(S): 5000 INJECTION INTRAVENOUS; SUBCUTANEOUS at 06:01

## 2022-08-04 RX ADMIN — Medication 325 MILLIGRAM(S): at 12:00

## 2022-08-04 RX ADMIN — Medication 300 MILLIGRAM(S): at 00:29

## 2022-08-04 RX ADMIN — Medication 500 MILLIGRAM(S): at 12:00

## 2022-08-04 RX ADMIN — Medication 300 MILLIGRAM(S): at 11:54

## 2022-08-04 NOTE — DISCHARGE NOTE NURSING/CASE MANAGEMENT/SOCIAL WORK - NSDCPEFALRISK_GEN_ALL_CORE
For information on Fall & Injury Prevention, visit: [email or web address] OR  [email or web address] OR  https://www.cdc.gov/steadi/patient.html

## 2022-08-04 NOTE — ADVANCED PRACTICE NURSE CONSULT - ASSESSMENT
Patient educated on how to clean wound, apply packing, covering with silicone foam with border, patient verbalized understanding, all questions answered. Patient states she is getting a visiting nurse tomorrow to help with wound care. Patient provided with wound care supplies for home. 
General: A&O4 , ambulatory at baseline, continent of urine and stool. Patient assessed in bed. Skin warm, dry with increased moisture in intertriginous folds, adequate skin turgor. +2 edema noted to BLE.    Left abdominal pannus - site of c section incision, opening measuring [id number], draining moderate amount of serous fluid. Periwound intact, no odor, no erythema, no increased warmth, no edema, no induration, no signs or symptoms of overt skin infection noted. Goals of care: protect periwound skin, manage/contain drainage.

## 2022-08-04 NOTE — ADVANCED PRACTICE NURSE CONSULT - RECOMMEDATIONS
Topical recommendations:     Left abdominal pannus/ site of previous c section - Cleanse with NS, pat dry. Apply Liquid barrier film to periwound skin (allow to dry). Cut Aquacel rope dressing vertically along stitching, pack wound depth, leaving 2 inches out to wick. Cover with small silicone foam with border. Change daily or PRN if soiled.     Plan of care discussed with patient and primary RN Kesha.     Please contact Wound/Ostomy Care Service Line if we can be of further assistance (ext [phone]).  
Please contact Wound/Ostomy Care Service Line if we can be of further assistance (ext [phone]).

## 2022-08-04 NOTE — DISCHARGE NOTE NURSING/CASE MANAGEMENT/SOCIAL WORK - NSDCFUADDAPPT_GEN_ALL_CORE_FT
- Continue BP meds as prescribed (hold is BP is under 110/60 or HR is under 60)  -Take blood pressure with at home cuff prior to taking medications; if BP is >150/90 call MD  - Return to hospital with headaches, visual changes, abdominal pain, nausea, vomiting, chest pain or shortness of breathe  - Follow up with OB in 2 days for BP check  - Follow up with [facility] (email sent for follow up; call [phone])

## 2022-08-04 NOTE — DISCHARGE NOTE NURSING/CASE MANAGEMENT/SOCIAL WORK - PATIENT PORTAL LINK FT
You can access the FollowMyHealth Patient Portal offered by [facility] by registering at the following website: [email or web address]. By joining [facility]’s FollowMyHealth portal, you will also be able to view your health information using other applications (apps) compatible with our system.

## 2022-08-04 NOTE — PROGRESS NOTE ADULT - SUBJECTIVE AND OBJECTIVE BOX
OB Progress Note:  Delivery, POD#6    S: 38yo POD#6 s/p RLTCS . Her pain is well controlled. She is tolerating a regular diet and passing flatus. Denies N/V. Denies CP/SOB/lightheadedness/dizziness. Endorses light vaginal bleeding, less than one pad per hour. She is ambulating without difficulty. Voiding spontaneously.     O:   Vital Signs Last 24 Hrs  T(C): 37.3 (04 Aug 2022 06:32), Max: 37.3 (04 Aug 2022 06:32)  T(F): 99.1 (04 Aug 2022 06:32), Max: 99.1 (04 Aug 2022 06:32)  HR: 73 (04 Aug 2022 06:) (73 - 88)  BP: 135/95 (04 Aug 2022 06:32) (114/71 - 139/90)  BP(mean): --  RR: 18 (04 Aug 2022 06:32) (18 - 19)  SpO2: 100% (04 Aug 2022 06:) (99% - 100%)    Parameters below as of 04 Aug 2022 06:32  Patient On (Oxygen Delivery Method): room air        Labs:  Blood type: B Positive  Rubella IgG: RPR: Negative                          8.9<L>   8.98 >-----------< 448<H>    (  @ 06:25 )             28.8<L>                        9.6<L>   8.72 >-----------< 386    (  05:50 )             32.1<L>                        8.2<L>   9.35 >-----------< 378    (  @ 21:38 )             26.7<L>    22 @ 06:25      138  |  101  |  14  ----------------------------<  87  4.5   |  26  |  0.75    22 @ 05:50      137  |  99  |  8   ----------------------------<  90  4.0   |  24  |  0.55    22 @ 21:38      141  |  105  |  12  ----------------------------<  88  4.3   |  26  |  0.64        Ca    7.5<L>      04 Aug 2022 06:25  Ca    8.3<L>      03 Aug 2022 05:50  Ca    9.7      02 Aug 2022 21:38  Mg     6.70<H>       Mg     6.30<H>         TPro  6.8  /  Alb  3.5  /  TBili  0.2  /  DBili  x   /  AST  24  /  ALT  38<H>  /  AlkPhos  100  22 @ 06:25  TPro  7.5  /  Alb  3.6  /  TBili  0.3  /  DBili  x   /  AST  39<H>  /  ALT  53<H>  /  AlkPhos  115  22 @ 05:50  TPro  6.7  /  Alb  3.3  /  TBili  <0.2  /  DBili  x   /  AST  39<H>  /  ALT  50<H>  /  AlkPhos  104  22 @ 21:38          PE:  General: NAD  Heart: extremities well-perfused  Lungs: breathing comfortably  Abdomen: Mildly distended, appropriately tender, firm fundus, incision with pink bandage in place on left aspect, denies any pain no erythema noted . Remaineder of incision intact  Extremities: No erythema, no pitting edema

## 2022-08-04 NOTE — CHART NOTE - NSCHARTNOTEFT_GEN_A_CORE
Called to expedite CT scan:    at 9am spoke to CT tech    at 12pm spoke to Radiology reading; CT was protocoled    at 4pm again spoke to CT tech, explained urgency/ need to CT he was unable to give me time at this point.      d/w OB attending Dr Gregorio Bonilla [facility]
PA Note    Called CT tech at 945am. Ana reports that patient is scheduled for CT at 1015am and transport has been sent to retrieve patient.    Jessica Oliver PA-C

## 2022-08-04 NOTE — PROGRESS NOTE ADULT - ASSESSMENT
A/P: 38yo POD#6 s/p rLTCS.  Patient is stable and doing well post-operatively. Postpartum course complicated by sIPEC and seroma on left side of incision     #siPEC  -Monitor blood pressures   -Continue on Labetalol 300 BID   -s/p Magnesium for 24 hours    #Seroma   -Opened in the office and packed  -Needs wet to dry changed daily  -Wound Care consult: change dressing daily   -CT A/P ordered, will expedite         - Continue regular diet.  - OOB, Increase ambulation.  - Continue motrin, tylenol, oxycodone PRN for pain control.       Patient would like to be discharged today     Meera Power, PGY 3   A/P: 36yo POD#6 s/p rLTCS.  Patient is stable and doing well post-operatively. Postpartum course complicated by sIPEC and seroma on left side of incision     #siPEC  -Monitor blood pressures   -Continue on Labetalol 300 BID   -s/p Magnesium for 24 hours  -AM HELLP labs wnl     #Seroma   -Opened in the office and packed  -Needs wet to dry changed daily  -Wound Care consult: change dressing daily   -CT A/P ordered, will expedite      #C/S    - Continue regular diet.  - OOB, Increase ambulation.  - Continue motrin, tylenol, oxycodone PRN for pain control.       Patient would like to be discharged today     Meera Power, PGY 3

## 2022-08-04 NOTE — ADVANCED PRACTICE NURSE CONSULT - REASON FOR CONSULT
Team called to bedside by primary RN for wound care dressing teaching, patient being discharged today. Patient known to [facility] service line last seen on 8/3/2022. 
Patient seen on skin care rounds after wound care referral received for assessment of skin impairment and recommendations of topical management. Patient is a 38y/o female s/p c section, readmitted with severe preeclampsia, found to have draining abdominal wound. Patient pending CT of abdomen. Chart reviewed: WBC 8.72.

## 2022-08-08 ENCOUNTER — NON-APPOINTMENT (OUTPATIENT)
Age: 37
End: 2022-08-08

## 2022-08-09 LAB — SURGICAL PATHOLOGY STUDY: SIGNIFICANT CHANGE UP

## 2022-08-15 ENCOUNTER — NON-APPOINTMENT (OUTPATIENT)
Age: 37
End: 2022-08-15

## 2022-11-18 NOTE — OB PROVIDER DELIVERY SUMMARY - NSURGENCYA_OBGYN_ALL_OB
Alert-The patient is alert, awake and responds to voice. The patient is oriented to time, place, and person. The triage nurse is able to obtain subjective information.
Scheduled

## 2023-02-24 NOTE — DISCHARGE NOTE PROVIDER - NSCORESITESY/N_GEN_A_CORE_RD
I called patient but the mailbox is full so I was unable to leave a message.  Antonieta Woodard CMA     Yes

## 2023-03-10 NOTE — DISCHARGE NOTE PROVIDER - PROVIDER TOKENS
PROVIDER:[TOKEN:[[id number]]] Winlevi Counseling:  I discussed with the patient the risks of topical clascoterone including but not limited to erythema, scaling, itching, and stinging. Patient voiced their understanding.
